# Patient Record
Sex: MALE | Race: WHITE | NOT HISPANIC OR LATINO | Employment: OTHER | URBAN - NONMETROPOLITAN AREA
[De-identification: names, ages, dates, MRNs, and addresses within clinical notes are randomized per-mention and may not be internally consistent; named-entity substitution may affect disease eponyms.]

---

## 2023-01-31 PROBLEM — G89.29 CHRONIC LEFT HIP PAIN: Status: ACTIVE | Noted: 2023-01-31

## 2023-01-31 PROBLEM — M62.81 MUSCLE WEAKNESS: Status: ACTIVE | Noted: 2023-01-31

## 2023-01-31 PROBLEM — M70.51 INFRAPATELLAR BURSITIS OF RIGHT KNEE: Status: ACTIVE | Noted: 2023-01-31

## 2023-01-31 PROBLEM — R00.0 TACHYCARDIA: Status: ACTIVE | Noted: 2023-01-31

## 2023-01-31 PROBLEM — M89.8X1 CHRONIC SCAPULAR PAIN: Status: ACTIVE | Noted: 2023-01-31

## 2023-01-31 PROBLEM — R93.1 ELEVATED CORONARY ARTERY CALCIUM SCORE: Status: ACTIVE | Noted: 2023-01-31

## 2023-01-31 PROBLEM — F41.9 ANXIETY: Status: ACTIVE | Noted: 2023-01-31

## 2023-01-31 PROBLEM — J18.9 PNEUMONIA: Status: ACTIVE | Noted: 2023-01-31

## 2023-01-31 PROBLEM — R93.1 ABNORMAL HEART SCORE CT: Status: ACTIVE | Noted: 2023-01-31

## 2023-01-31 PROBLEM — K76.9 LIVER LESION: Status: ACTIVE | Noted: 2023-01-31

## 2023-01-31 PROBLEM — M79.18 MYOFASCIAL PAIN: Status: ACTIVE | Noted: 2023-01-31

## 2023-01-31 PROBLEM — R29.898 LOWER EXTREMITY WEAKNESS: Status: ACTIVE | Noted: 2023-01-31

## 2023-01-31 PROBLEM — M54.50 BILATERAL LOW BACK PAIN: Status: ACTIVE | Noted: 2023-01-31

## 2023-01-31 PROBLEM — E87.6 HYPOKALEMIA: Status: ACTIVE | Noted: 2023-01-31

## 2023-01-31 PROBLEM — S83.8X2A INJURY OF MENISCUS OF LEFT KNEE: Status: ACTIVE | Noted: 2023-01-31

## 2023-01-31 PROBLEM — K74.60 CIRRHOSIS (MULTI): Status: ACTIVE | Noted: 2023-01-31

## 2023-01-31 PROBLEM — R07.9 CHEST PAIN: Status: ACTIVE | Noted: 2023-01-31

## 2023-01-31 PROBLEM — M25.552 CHRONIC LEFT HIP PAIN: Status: ACTIVE | Noted: 2023-01-31

## 2023-01-31 PROBLEM — M54.14 RADICULAR PAIN OF THORACIC REGION: Status: ACTIVE | Noted: 2023-01-31

## 2023-01-31 PROBLEM — M25.572 CHRONIC PAIN OF LEFT ANKLE: Status: ACTIVE | Noted: 2023-01-31

## 2023-01-31 PROBLEM — G56.03 BILATERAL CARPAL TUNNEL SYNDROME: Status: ACTIVE | Noted: 2023-01-31

## 2023-01-31 PROBLEM — I27.21 PULMONARY ARTERY HYPERTENSION (MULTI): Status: ACTIVE | Noted: 2023-01-31

## 2023-01-31 PROBLEM — G89.29 CHRONIC PAIN OF LEFT ANKLE: Status: ACTIVE | Noted: 2023-01-31

## 2023-01-31 PROBLEM — S80.02XA CONTUSION OF LEFT KNEE: Status: ACTIVE | Noted: 2023-01-31

## 2023-01-31 PROBLEM — K62.5 RECTAL BLEEDING: Status: ACTIVE | Noted: 2023-01-31

## 2023-01-31 PROBLEM — M25.462 EFFUSION OF LEFT KNEE: Status: ACTIVE | Noted: 2023-01-31

## 2023-01-31 PROBLEM — R14.0 ABDOMINAL DISTENSION: Status: ACTIVE | Noted: 2023-01-31

## 2023-01-31 PROBLEM — M54.9 UPPER BACK PAIN: Status: ACTIVE | Noted: 2023-01-31

## 2023-01-31 PROBLEM — I10 HYPERTENSION, BENIGN: Status: ACTIVE | Noted: 2023-01-31

## 2023-01-31 PROBLEM — M25.562 LEFT KNEE PAIN: Status: ACTIVE | Noted: 2023-01-31

## 2023-01-31 PROBLEM — I85.00 ESOPHAGEAL VARICES (MULTI): Status: ACTIVE | Noted: 2023-01-31

## 2023-01-31 PROBLEM — J45.21 MILD INTERMITTENT ASTHMA WITH ACUTE EXACERBATION (HHS-HCC): Status: ACTIVE | Noted: 2023-01-31

## 2023-01-31 PROBLEM — S82.002D CLOSED NONDISPLACED FRACTURE OF LEFT PATELLA WITH ROUTINE HEALING: Status: ACTIVE | Noted: 2023-01-31

## 2023-01-31 PROBLEM — K21.9 GERD (GASTROESOPHAGEAL REFLUX DISEASE): Status: ACTIVE | Noted: 2023-01-31

## 2023-01-31 PROBLEM — E55.9 VITAMIN D DEFICIENCY: Status: ACTIVE | Noted: 2023-01-31

## 2023-01-31 PROBLEM — R53.83 FATIGUE: Status: ACTIVE | Noted: 2023-01-31

## 2023-01-31 PROBLEM — K80.20 CHOLELITHIASIS: Status: ACTIVE | Noted: 2023-01-31

## 2023-01-31 PROBLEM — K29.00 ACUTE GASTRITIS: Status: ACTIVE | Noted: 2023-01-31

## 2023-01-31 PROBLEM — S89.92XS LEFT KNEE INJURY, SEQUELA: Status: ACTIVE | Noted: 2023-01-31

## 2023-01-31 PROBLEM — L29.9 PRURITUS: Status: ACTIVE | Noted: 2023-01-31

## 2023-01-31 PROBLEM — K57.30 DIVERTICULOSIS OF COLON WITHOUT DIVERTICULITIS: Status: ACTIVE | Noted: 2023-01-31

## 2023-01-31 PROBLEM — R73.9 HYPERGLYCEMIA: Status: ACTIVE | Noted: 2023-01-31

## 2023-01-31 PROBLEM — R19.5 DARK STOOLS: Status: ACTIVE | Noted: 2023-01-31

## 2023-01-31 PROBLEM — M70.52 INFRAPATELLAR BURSITIS OF LEFT KNEE: Status: ACTIVE | Noted: 2023-01-31

## 2023-01-31 PROBLEM — G89.29 CHRONIC SCAPULAR PAIN: Status: ACTIVE | Noted: 2023-01-31

## 2023-01-31 PROBLEM — R79.89 LOW TESTOSTERONE IN MALE: Status: ACTIVE | Noted: 2023-01-31

## 2023-01-31 PROBLEM — E83.42 HYPOMAGNESEMIA: Status: ACTIVE | Noted: 2023-01-31

## 2023-01-31 PROBLEM — E78.5 DYSLIPIDEMIA: Status: ACTIVE | Noted: 2023-01-31

## 2023-01-31 RX ORDER — CLOPIDOGREL BISULFATE 75 MG/1
1 TABLET ORAL DAILY
COMMUNITY
Start: 2022-04-19 | End: 2023-06-14 | Stop reason: SDUPTHER

## 2023-01-31 RX ORDER — FAMOTIDINE 40 MG/1
1 TABLET, FILM COATED ORAL DAILY
COMMUNITY
Start: 2022-06-20 | End: 2023-12-13 | Stop reason: ALTCHOICE

## 2023-01-31 RX ORDER — NAPROXEN SODIUM 220 MG/1
81 TABLET, FILM COATED ORAL DAILY
COMMUNITY
Start: 2022-06-20 | End: 2023-06-14 | Stop reason: ALTCHOICE

## 2023-01-31 RX ORDER — ATORVASTATIN CALCIUM 40 MG/1
1 TABLET, FILM COATED ORAL DAILY
COMMUNITY
End: 2023-06-14 | Stop reason: SDUPTHER

## 2023-01-31 RX ORDER — LANOLIN ALCOHOL/MO/W.PET/CERES
1 CREAM (GRAM) TOPICAL DAILY
COMMUNITY
Start: 2021-09-27 | End: 2024-03-13 | Stop reason: SDUPTHER

## 2023-01-31 RX ORDER — NADOLOL 20 MG/1
1 TABLET ORAL DAILY
COMMUNITY
Start: 2018-02-07

## 2023-01-31 RX ORDER — FUROSEMIDE 40 MG/1
1 TABLET ORAL DAILY
COMMUNITY
Start: 2018-02-07 | End: 2023-11-10 | Stop reason: SDUPTHER

## 2023-01-31 RX ORDER — PANTOPRAZOLE SODIUM 40 MG/1
1 TABLET, DELAYED RELEASE ORAL DAILY
COMMUNITY
End: 2023-03-14 | Stop reason: SDUPTHER

## 2023-01-31 RX ORDER — LANOLIN ALCOHOL/MO/W.PET/CERES
1 CREAM (GRAM) TOPICAL 2 TIMES DAILY
COMMUNITY
Start: 2022-01-04 | End: 2023-06-06 | Stop reason: SDUPTHER

## 2023-01-31 RX ORDER — NORTRIPTYLINE HYDROCHLORIDE 10 MG/1
CAPSULE ORAL NIGHTLY
COMMUNITY
Start: 2021-09-14

## 2023-01-31 RX ORDER — AMLODIPINE BESYLATE 5 MG/1
1 TABLET ORAL DAILY
COMMUNITY
Start: 2019-02-14

## 2023-01-31 RX ORDER — POTASSIUM CHLORIDE 1500 MG/1
1 TABLET, EXTENDED RELEASE ORAL DAILY
COMMUNITY
Start: 2019-11-14 | End: 2023-11-27 | Stop reason: SDUPTHER

## 2023-03-14 ENCOUNTER — OFFICE VISIT (OUTPATIENT)
Dept: PRIMARY CARE | Facility: CLINIC | Age: 53
End: 2023-03-14
Payer: MEDICARE

## 2023-03-14 VITALS
OXYGEN SATURATION: 97 % | HEIGHT: 66 IN | HEART RATE: 87 BPM | WEIGHT: 207.2 LBS | SYSTOLIC BLOOD PRESSURE: 102 MMHG | TEMPERATURE: 98.2 F | BODY MASS INDEX: 33.3 KG/M2 | DIASTOLIC BLOOD PRESSURE: 60 MMHG

## 2023-03-14 DIAGNOSIS — K74.60 CIRRHOSIS OF LIVER WITHOUT ASCITES, UNSPECIFIED HEPATIC CIRRHOSIS TYPE (MULTI): ICD-10-CM

## 2023-03-14 DIAGNOSIS — Z00.00 MEDICARE ANNUAL WELLNESS VISIT, SUBSEQUENT: Primary | ICD-10-CM

## 2023-03-14 DIAGNOSIS — K21.9 GASTROESOPHAGEAL REFLUX DISEASE WITHOUT ESOPHAGITIS: ICD-10-CM

## 2023-03-14 PROCEDURE — 3008F BODY MASS INDEX DOCD: CPT | Performed by: FAMILY MEDICINE

## 2023-03-14 PROCEDURE — 3078F DIAST BP <80 MM HG: CPT | Performed by: FAMILY MEDICINE

## 2023-03-14 PROCEDURE — G0439 PPPS, SUBSEQ VISIT: HCPCS | Performed by: FAMILY MEDICINE

## 2023-03-14 PROCEDURE — 3074F SYST BP LT 130 MM HG: CPT | Performed by: FAMILY MEDICINE

## 2023-03-14 RX ORDER — PANTOPRAZOLE SODIUM 40 MG/1
40 TABLET, DELAYED RELEASE ORAL
Qty: 90 TABLET | Refills: 1 | Status: SHIPPED | OUTPATIENT
Start: 2023-03-14 | End: 2024-03-13 | Stop reason: SDUPTHER

## 2023-03-14 ASSESSMENT — PATIENT HEALTH QUESTIONNAIRE - PHQ9
2. FEELING DOWN, DEPRESSED OR HOPELESS: NOT AT ALL
SUM OF ALL RESPONSES TO PHQ9 QUESTIONS 1 AND 2: 0
1. LITTLE INTEREST OR PLEASURE IN DOING THINGS: NOT AT ALL

## 2023-03-14 ASSESSMENT — ACTIVITIES OF DAILY LIVING (ADL)
DOING_HOUSEWORK: INDEPENDENT
GROCERY_SHOPPING: INDEPENDENT
BATHING: INDEPENDENT
TAKING_MEDICATION: INDEPENDENT
DRESSING: INDEPENDENT
MANAGING_FINANCES: INDEPENDENT

## 2023-03-14 ASSESSMENT — ENCOUNTER SYMPTOMS
OCCASIONAL FEELINGS OF UNSTEADINESS: 1
LOSS OF SENSATION IN FEET: 0
DEPRESSION: 0

## 2023-03-14 NOTE — PATIENT INSTRUCTIONS
Please follow up in 3 months  Please take your medications as prescribed  please follow up with GI and nutritionist

## 2023-03-14 NOTE — PROGRESS NOTES
"Subjective   Reason for Visit: Tin Mendoza is an 52 y.o. male here for a Medicare Wellness visit.   Acid reflux  Weight gain         Reviewed all medications by prescribing practitioner or clinical pharmacist (such as prescriptions, OTCs, herbal therapies and supplements) and documented in the medical record.    HPI    Here for medicare annual visit  Has been having GERD, on pepcid and protonix, not controlled. Does have cirrhosis, overdue for follow up with GI  Having difficulty losing weight,been eating healthier and exercising.     Patient Self Assessment of Health Status  Patient Self Assessment: Poor    Nutrition and Exercise  Current Diet: Well Balanced Diet  Adequate Fluid Intake: Yes  Caffeine: Yes  Exercise Frequency: Infrequently    Functional Ability/Level of Safety  Cognitive Impairment Observed: No cognitive impairment observed  Cognitive Impairment Reported: No cognitive impairment reported by patient or family    Home Safety Risk Factors: None    Patient Care Team:  Noe Faust MD as PCP - General  Noe Faust MD as PCP - [facility] Attributed Provider     Review of Systems  General: no fever  Eyes: no blurry vision  ENT: no sore throat, no ear pain  Resp: no cough, sob or wheezing  Cardio: no chest pain, no palpitations  Abd: no nausea/vomiting  : no dysuria, no increased urinary frequency      Objective   Vitals:  /60   Pulse 87   Temp 36.8 °C (98.2 °F)   Ht 1.676 m (5' 6\")   Wt 94 kg (207 lb 3.2 oz)   SpO2 97%   BMI 33.44 kg/m²       Physical Exam  Gen: NAD, alert  Head: normocephalic/atraumatic  Eyes: conjunctivae normal  Ears: canals clear bilaterally, TM normal   Nose: Deferred due to covid precautions, wearing mask  Oropharynx: Deferred due to covid precautions, wearing mask  Resp: Clear to auscultation  CVS: Regular rate and rhythm  Abdomen: soft, NT, ND  Ext: no edema, NT of lower extremities  Neuro: gait normal       Assessment/Plan   Problem List " Items Addressed This Visit       Cirrhosis (CMS/HCC)    Relevant Orders    Referral to Gastroenterology    GERD (gastroesophageal reflux disease)    Relevant Medications    pantoprazole (ProtoNix) 40 mg EC tablet    Other Relevant Orders    Referral to Gastroenterology     Other Visit Diagnoses       Medicare annual wellness visit, subsequent    -  Primary    BMI 33.0-33.9,adult        Relevant Orders    Referral to Nutrition Services

## 2023-06-06 DIAGNOSIS — E83.42 HYPOMAGNESEMIA: ICD-10-CM

## 2023-06-06 RX ORDER — LANOLIN ALCOHOL/MO/W.PET/CERES
1 CREAM (GRAM) TOPICAL 2 TIMES DAILY
Qty: 180 TABLET | Refills: 1 | Status: SHIPPED | OUTPATIENT
Start: 2023-06-06 | End: 2023-12-03

## 2023-06-14 ENCOUNTER — LAB (OUTPATIENT)
Dept: LAB | Facility: LAB | Age: 53
End: 2023-06-14
Payer: MEDICARE

## 2023-06-14 ENCOUNTER — OFFICE VISIT (OUTPATIENT)
Dept: PRIMARY CARE | Facility: CLINIC | Age: 53
End: 2023-06-14
Payer: MEDICARE

## 2023-06-14 VITALS
OXYGEN SATURATION: 98 % | SYSTOLIC BLOOD PRESSURE: 108 MMHG | BODY MASS INDEX: 34.35 KG/M2 | HEART RATE: 87 BPM | DIASTOLIC BLOOD PRESSURE: 74 MMHG | TEMPERATURE: 97.7 F | WEIGHT: 201.2 LBS | HEIGHT: 64 IN

## 2023-06-14 DIAGNOSIS — I25.83 CORONARY ARTERY DISEASE DUE TO LIPID RICH PLAQUE: ICD-10-CM

## 2023-06-14 DIAGNOSIS — I27.21 PULMONARY ARTERY HYPERTENSION (MULTI): ICD-10-CM

## 2023-06-14 DIAGNOSIS — I85.00 ESOPHAGEAL VARICES WITHOUT BLEEDING, UNSPECIFIED ESOPHAGEAL VARICES TYPE (MULTI): ICD-10-CM

## 2023-06-14 DIAGNOSIS — F17.210 CIGARETTE NICOTINE DEPENDENCE WITHOUT COMPLICATION: ICD-10-CM

## 2023-06-14 DIAGNOSIS — E78.5 DYSLIPIDEMIA: ICD-10-CM

## 2023-06-14 DIAGNOSIS — K74.60 CIRRHOSIS OF LIVER WITHOUT ASCITES, UNSPECIFIED HEPATIC CIRRHOSIS TYPE (MULTI): ICD-10-CM

## 2023-06-14 DIAGNOSIS — I25.10 CORONARY ARTERY DISEASE DUE TO LIPID RICH PLAQUE: ICD-10-CM

## 2023-06-14 DIAGNOSIS — K74.60 CIRRHOSIS OF LIVER WITHOUT ASCITES, UNSPECIFIED HEPATIC CIRRHOSIS TYPE (MULTI): Primary | ICD-10-CM

## 2023-06-14 PROBLEM — S80.02XA CONTUSION OF LEFT KNEE: Status: RESOLVED | Noted: 2023-01-31 | Resolved: 2023-06-14

## 2023-06-14 PROBLEM — M70.51 INFRAPATELLAR BURSITIS OF RIGHT KNEE: Status: RESOLVED | Noted: 2023-01-31 | Resolved: 2023-06-14

## 2023-06-14 PROBLEM — M25.562 LEFT KNEE PAIN: Status: RESOLVED | Noted: 2023-01-31 | Resolved: 2023-06-14

## 2023-06-14 PROBLEM — M25.552 CHRONIC LEFT HIP PAIN: Status: RESOLVED | Noted: 2023-01-31 | Resolved: 2023-06-14

## 2023-06-14 PROBLEM — K80.20 CHOLELITHIASIS: Status: RESOLVED | Noted: 2023-01-31 | Resolved: 2023-06-14

## 2023-06-14 PROBLEM — J18.9 PNEUMONIA: Status: RESOLVED | Noted: 2023-01-31 | Resolved: 2023-06-14

## 2023-06-14 PROBLEM — R29.898 LOWER EXTREMITY WEAKNESS: Status: RESOLVED | Noted: 2023-01-31 | Resolved: 2023-06-14

## 2023-06-14 PROBLEM — S83.8X2A INJURY OF MENISCUS OF LEFT KNEE: Status: RESOLVED | Noted: 2023-01-31 | Resolved: 2023-06-14

## 2023-06-14 PROBLEM — K62.5 RECTAL BLEEDING: Status: RESOLVED | Noted: 2023-01-31 | Resolved: 2023-06-14

## 2023-06-14 PROBLEM — G89.29 CHRONIC LEFT HIP PAIN: Status: RESOLVED | Noted: 2023-01-31 | Resolved: 2023-06-14

## 2023-06-14 PROBLEM — M25.572 CHRONIC PAIN OF LEFT ANKLE: Status: RESOLVED | Noted: 2023-01-31 | Resolved: 2023-06-14

## 2023-06-14 PROBLEM — M54.14 RADICULAR PAIN OF THORACIC REGION: Status: RESOLVED | Noted: 2023-01-31 | Resolved: 2023-06-14

## 2023-06-14 PROBLEM — G89.29 CHRONIC SCAPULAR PAIN: Status: RESOLVED | Noted: 2023-01-31 | Resolved: 2023-06-14

## 2023-06-14 PROBLEM — S82.002D CLOSED NONDISPLACED FRACTURE OF LEFT PATELLA WITH ROUTINE HEALING: Status: RESOLVED | Noted: 2023-01-31 | Resolved: 2023-06-14

## 2023-06-14 PROBLEM — R53.83 FATIGUE: Status: RESOLVED | Noted: 2023-01-31 | Resolved: 2023-06-14

## 2023-06-14 PROBLEM — G56.03 BILATERAL CARPAL TUNNEL SYNDROME: Status: RESOLVED | Noted: 2023-01-31 | Resolved: 2023-06-14

## 2023-06-14 PROBLEM — M25.462 EFFUSION OF LEFT KNEE: Status: RESOLVED | Noted: 2023-01-31 | Resolved: 2023-06-14

## 2023-06-14 PROBLEM — R19.5 DARK STOOLS: Status: RESOLVED | Noted: 2023-01-31 | Resolved: 2023-06-14

## 2023-06-14 PROBLEM — K29.00 ACUTE GASTRITIS: Status: RESOLVED | Noted: 2023-01-31 | Resolved: 2023-06-14

## 2023-06-14 PROBLEM — R00.0 TACHYCARDIA: Status: RESOLVED | Noted: 2023-01-31 | Resolved: 2023-06-14

## 2023-06-14 PROBLEM — M54.50 BILATERAL LOW BACK PAIN: Status: RESOLVED | Noted: 2023-01-31 | Resolved: 2023-06-14

## 2023-06-14 PROBLEM — G89.29 CHRONIC PAIN OF LEFT ANKLE: Status: RESOLVED | Noted: 2023-01-31 | Resolved: 2023-06-14

## 2023-06-14 PROBLEM — M89.8X1 CHRONIC SCAPULAR PAIN: Status: RESOLVED | Noted: 2023-01-31 | Resolved: 2023-06-14

## 2023-06-14 PROBLEM — K57.30 DIVERTICULOSIS OF COLON WITHOUT DIVERTICULITIS: Status: RESOLVED | Noted: 2023-01-31 | Resolved: 2023-06-14

## 2023-06-14 PROBLEM — R73.9 HYPERGLYCEMIA: Status: RESOLVED | Noted: 2023-01-31 | Resolved: 2023-06-14

## 2023-06-14 PROBLEM — R93.1 ABNORMAL HEART SCORE CT: Status: RESOLVED | Noted: 2023-01-31 | Resolved: 2023-06-14

## 2023-06-14 PROBLEM — M79.18 MYOFASCIAL PAIN: Status: RESOLVED | Noted: 2023-01-31 | Resolved: 2023-06-14

## 2023-06-14 PROBLEM — L29.9 PRURITUS: Status: RESOLVED | Noted: 2023-01-31 | Resolved: 2023-06-14

## 2023-06-14 PROBLEM — R14.0 ABDOMINAL DISTENSION: Status: RESOLVED | Noted: 2023-01-31 | Resolved: 2023-06-14

## 2023-06-14 PROBLEM — E87.6 HYPOKALEMIA: Status: RESOLVED | Noted: 2023-01-31 | Resolved: 2023-06-14

## 2023-06-14 PROBLEM — M54.9 UPPER BACK PAIN: Status: RESOLVED | Noted: 2023-01-31 | Resolved: 2023-06-14

## 2023-06-14 PROBLEM — M62.81 MUSCLE WEAKNESS: Status: RESOLVED | Noted: 2023-01-31 | Resolved: 2023-06-14

## 2023-06-14 PROBLEM — R07.9 CHEST PAIN: Status: RESOLVED | Noted: 2023-01-31 | Resolved: 2023-06-14

## 2023-06-14 PROBLEM — R93.1 ELEVATED CORONARY ARTERY CALCIUM SCORE: Status: RESOLVED | Noted: 2023-01-31 | Resolved: 2023-06-14

## 2023-06-14 PROBLEM — E55.9 VITAMIN D DEFICIENCY: Status: RESOLVED | Noted: 2023-01-31 | Resolved: 2023-06-14

## 2023-06-14 PROBLEM — K76.9 LIVER LESION: Status: RESOLVED | Noted: 2023-01-31 | Resolved: 2023-06-14

## 2023-06-14 PROBLEM — S89.92XS LEFT KNEE INJURY, SEQUELA: Status: RESOLVED | Noted: 2023-01-31 | Resolved: 2023-06-14

## 2023-06-14 PROBLEM — M70.52 INFRAPATELLAR BURSITIS OF LEFT KNEE: Status: RESOLVED | Noted: 2023-01-31 | Resolved: 2023-06-14

## 2023-06-14 LAB
ALANINE AMINOTRANSFERASE (SGPT) (U/L) IN SER/PLAS: 16 U/L (ref 10–52)
ALBUMIN (G/DL) IN SER/PLAS: 4.4 G/DL (ref 3.4–5)
ALKALINE PHOSPHATASE (U/L) IN SER/PLAS: 64 U/L (ref 33–120)
ALPHA-1 FETOPROTEIN (NG/ML) IN SER/PLAS: 4 NG/ML (ref 0–9)
ANION GAP IN SER/PLAS: 12 MMOL/L (ref 10–20)
ASPARTATE AMINOTRANSFERASE (SGOT) (U/L) IN SER/PLAS: 19 U/L (ref 9–39)
BILIRUBIN TOTAL (MG/DL) IN SER/PLAS: 0.3 MG/DL (ref 0–1.2)
CALCIUM (MG/DL) IN SER/PLAS: 9.5 MG/DL (ref 8.6–10.3)
CARBON DIOXIDE, TOTAL (MMOL/L) IN SER/PLAS: 26 MMOL/L (ref 21–32)
CHLORIDE (MMOL/L) IN SER/PLAS: 103 MMOL/L (ref 98–107)
CHOLESTEROL (MG/DL) IN SER/PLAS: 250 MG/DL (ref 0–199)
CHOLESTEROL IN HDL (MG/DL) IN SER/PLAS: 38.7 MG/DL
CHOLESTEROL/HDL RATIO: 6.5
CREATININE (MG/DL) IN SER/PLAS: 0.86 MG/DL (ref 0.5–1.3)
ERYTHROCYTE DISTRIBUTION WIDTH (RATIO) BY AUTOMATED COUNT: 12.4 % (ref 11.5–14.5)
ERYTHROCYTE MEAN CORPUSCULAR HEMOGLOBIN CONCENTRATION (G/DL) BY AUTOMATED: 32.6 G/DL (ref 32–36)
ERYTHROCYTE MEAN CORPUSCULAR VOLUME (FL) BY AUTOMATED COUNT: 97 FL (ref 80–100)
ERYTHROCYTES (10*6/UL) IN BLOOD BY AUTOMATED COUNT: 4.73 X10E12/L (ref 4.5–5.9)
GFR MALE: >90 ML/MIN/1.73M2
GLUCOSE (MG/DL) IN SER/PLAS: 104 MG/DL (ref 74–99)
HEMATOCRIT (%) IN BLOOD BY AUTOMATED COUNT: 46 % (ref 41–52)
HEMOGLOBIN (G/DL) IN BLOOD: 15 G/DL (ref 13.5–17.5)
LDL: 160 MG/DL (ref 0–99)
LEUKOCYTES (10*3/UL) IN BLOOD BY AUTOMATED COUNT: 9.8 X10E9/L (ref 4.4–11.3)
MAGNESIUM (MG/DL) IN SER/PLAS: 1.91 MG/DL (ref 1.6–2.4)
NON HDL CHOLESTEROL: 211 MG/DL
PLATELETS (10*3/UL) IN BLOOD AUTOMATED COUNT: 212 X10E9/L (ref 150–450)
POTASSIUM (MMOL/L) IN SER/PLAS: 4.4 MMOL/L (ref 3.5–5.3)
PROTEIN TOTAL: 7.5 G/DL (ref 6.4–8.2)
SODIUM (MMOL/L) IN SER/PLAS: 137 MMOL/L (ref 136–145)
THYROTROPIN (MIU/L) IN SER/PLAS BY DETECTION LIMIT <= 0.05 MIU/L: 2.19 MIU/L (ref 0.44–3.98)
TRIGLYCERIDE (MG/DL) IN SER/PLAS: 258 MG/DL (ref 0–149)
UREA NITROGEN (MG/DL) IN SER/PLAS: 13 MG/DL (ref 6–23)
VLDL: 52 MG/DL (ref 0–40)

## 2023-06-14 PROCEDURE — 3008F BODY MASS INDEX DOCD: CPT | Performed by: FAMILY MEDICINE

## 2023-06-14 PROCEDURE — 83735 ASSAY OF MAGNESIUM: CPT

## 2023-06-14 PROCEDURE — 99214 OFFICE O/P EST MOD 30 MIN: CPT | Performed by: FAMILY MEDICINE

## 2023-06-14 PROCEDURE — 80061 LIPID PANEL: CPT

## 2023-06-14 PROCEDURE — 3078F DIAST BP <80 MM HG: CPT | Performed by: FAMILY MEDICINE

## 2023-06-14 PROCEDURE — 3074F SYST BP LT 130 MM HG: CPT | Performed by: FAMILY MEDICINE

## 2023-06-14 PROCEDURE — 4004F PT TOBACCO SCREEN RCVD TLK: CPT | Performed by: FAMILY MEDICINE

## 2023-06-14 PROCEDURE — 84443 ASSAY THYROID STIM HORMONE: CPT

## 2023-06-14 PROCEDURE — 36415 COLL VENOUS BLD VENIPUNCTURE: CPT

## 2023-06-14 PROCEDURE — 82105 ALPHA-FETOPROTEIN SERUM: CPT

## 2023-06-14 PROCEDURE — 80053 COMPREHEN METABOLIC PANEL: CPT

## 2023-06-14 PROCEDURE — 85027 COMPLETE CBC AUTOMATED: CPT

## 2023-06-14 RX ORDER — CLOPIDOGREL BISULFATE 75 MG/1
75 TABLET ORAL DAILY
Qty: 90 TABLET | Refills: 1 | Status: SHIPPED | OUTPATIENT
Start: 2023-06-14 | End: 2024-04-17 | Stop reason: SDUPTHER

## 2023-06-14 RX ORDER — ATORVASTATIN CALCIUM 40 MG/1
40 TABLET, FILM COATED ORAL DAILY
Qty: 90 TABLET | Refills: 1 | Status: SHIPPED | OUTPATIENT
Start: 2023-06-14 | End: 2024-04-17 | Stop reason: SDUPTHER

## 2023-06-14 ASSESSMENT — PATIENT HEALTH QUESTIONNAIRE - PHQ9
2. FEELING DOWN, DEPRESSED OR HOPELESS: NOT AT ALL
1. LITTLE INTEREST OR PLEASURE IN DOING THINGS: NOT AT ALL
SUM OF ALL RESPONSES TO PHQ9 QUESTIONS 1 AND 2: 0

## 2023-06-14 ASSESSMENT — ENCOUNTER SYMPTOMS
OCCASIONAL FEELINGS OF UNSTEADINESS: 0
DEPRESSION: 0
LOSS OF SENSATION IN FEET: 0

## 2023-06-14 NOTE — PROGRESS NOTES
"Subjective   Patient ID: Tin Mendoza is a 52 y.o. male who presents for Follow-up (Fasting today/Weight concerns is all).  HPI  Saw cardiology for elevated cardiac score, stopped the ASA and was started on plavix and lipitor  Has been gaining weight is following with nutritionist  Has cirrhosis, been years since he saw GI, has history of esophageal varices and pulmonary artery hypertension, due for EGD as well  Still smoking, has a 30 pack year history of smoking    Review of Systems  General: no fever  Eyes: no blurry vision  ENT: no sore throat, no ear pain  Resp: no cough, sob or wheezing  Cardio: no chest pain, no palpitations  Abd: no nausea/vomiting  : no dysuria, no increased urinary frequency      /74   Pulse 87   Temp 36.5 °C (97.7 °F)   Ht 1.626 m (5' 4\")   Wt 91.3 kg (201 lb 3.2 oz)   SpO2 98%   BMI 34.54 kg/m²       Objective   Physical Exam  Gen: NAD, alert  Head: normocephalic/atraumatic  Eyes: conjunctivae normal  Ears: canals clear bilaterally, TM normal   Nose: external nose normal   Resp: Clear to auscultation  CVS: Regular rate and rhythm  Abdomen: soft, NT, ND  Ext: no edema, NT of lower extremities  Neuro: gait normal       Assessment/Plan   Problem List Items Addressed This Visit       Cirrhosis (CMS/HCC) - Primary    Relevant Orders    Comprehensive Metabolic Panel    CBC    Magnesium    US abdomen limited liver    Referral to Gastroenterology    Alpha-Fetoprotein    Dyslipidemia    Relevant Orders    Lipid Panel    TSH with reflex to Free T4 if abnormal    Esophageal varices (CMS/HCC)    Relevant Orders    Referral to Gastroenterology    Pulmonary artery hypertension (CMS/HCC)     Other Visit Diagnoses       Coronary artery disease due to lipid rich plaque        Relevant Medications    clopidogrel (Plavix) 75 mg tablet    atorvastatin (Lipitor) 40 mg tablet    Cigarette nicotine dependence without complication        Relevant Orders    CT lung screening low dose    BMI " 34.0-34.9,adult

## 2023-06-14 NOTE — PATIENT INSTRUCTIONS
Please follow up in 6 months  Please take your medications as prescribed  please follow up with GI  Please get your blood work, CT chest and ultrasound done

## 2023-11-07 ENCOUNTER — TELEPHONE (OUTPATIENT)
Dept: PRIMARY CARE | Facility: CLINIC | Age: 53
End: 2023-11-07
Payer: MEDICARE

## 2023-11-07 DIAGNOSIS — J40 BRONCHITIS: Primary | ICD-10-CM

## 2023-11-07 RX ORDER — AZITHROMYCIN 250 MG/1
TABLET, FILM COATED ORAL
Qty: 6 TABLET | Refills: 0 | Status: SHIPPED | OUTPATIENT
Start: 2023-11-07 | End: 2023-11-12

## 2023-11-07 NOTE — TELEPHONE ENCOUNTER
Patient was seen at [facility] for Bronchitis.  Got Z-Cain.   Finished it and is doing better but asking for another round.  Please send to [facility] in [place]

## 2023-11-10 DIAGNOSIS — I27.21 PULMONARY ARTERY HYPERTENSION (MULTI): ICD-10-CM

## 2023-11-10 RX ORDER — FUROSEMIDE 40 MG/1
40 TABLET ORAL DAILY
Qty: 90 TABLET | Refills: 1 | Status: SHIPPED | OUTPATIENT
Start: 2023-11-10 | End: 2024-02-21 | Stop reason: SDUPTHER

## 2023-11-27 DIAGNOSIS — E78.5 DYSLIPIDEMIA: ICD-10-CM

## 2023-11-28 RX ORDER — POTASSIUM CHLORIDE 20 MEQ/1
20 TABLET, EXTENDED RELEASE ORAL DAILY
Qty: 90 TABLET | Refills: 1 | Status: SHIPPED | OUTPATIENT
Start: 2023-11-28 | End: 2024-02-21 | Stop reason: SDUPTHER

## 2023-12-13 ENCOUNTER — OFFICE VISIT (OUTPATIENT)
Dept: PRIMARY CARE | Facility: CLINIC | Age: 53
End: 2023-12-13
Payer: MEDICARE

## 2023-12-13 ENCOUNTER — LAB (OUTPATIENT)
Dept: LAB | Facility: LAB | Age: 53
End: 2023-12-13
Payer: MEDICARE

## 2023-12-13 VITALS
DIASTOLIC BLOOD PRESSURE: 72 MMHG | TEMPERATURE: 97 F | SYSTOLIC BLOOD PRESSURE: 119 MMHG | BODY MASS INDEX: 32.61 KG/M2 | OXYGEN SATURATION: 97 % | WEIGHT: 191 LBS | HEART RATE: 67 BPM | HEIGHT: 64 IN

## 2023-12-13 DIAGNOSIS — R73.9 BORDERLINE HYPERGLYCEMIA: ICD-10-CM

## 2023-12-13 DIAGNOSIS — R53.1 LEFT-SIDED WEAKNESS: Primary | ICD-10-CM

## 2023-12-13 DIAGNOSIS — E55.9 VITAMIN D DEFICIENCY: ICD-10-CM

## 2023-12-13 DIAGNOSIS — M62.838 MUSCLE SPASM: ICD-10-CM

## 2023-12-13 DIAGNOSIS — R42 LIGHTHEADEDNESS: ICD-10-CM

## 2023-12-13 LAB
25(OH)D3 SERPL-MCNC: 16 NG/ML (ref 30–100)
ANION GAP SERPL CALC-SCNC: 13 MMOL/L (ref 10–20)
BUN SERPL-MCNC: 15 MG/DL (ref 6–23)
CALCIUM SERPL-MCNC: 9.5 MG/DL (ref 8.6–10.3)
CHLORIDE SERPL-SCNC: 104 MMOL/L (ref 98–107)
CO2 SERPL-SCNC: 27 MMOL/L (ref 21–32)
CREAT SERPL-MCNC: 0.85 MG/DL (ref 0.5–1.3)
ERYTHROCYTE [DISTWIDTH] IN BLOOD BY AUTOMATED COUNT: 12.5 % (ref 11.5–14.5)
EST. AVERAGE GLUCOSE BLD GHB EST-MCNC: 114 MG/DL
GFR SERPL CREATININE-BSD FRML MDRD: >90 ML/MIN/1.73M*2
GLUCOSE SERPL-MCNC: 96 MG/DL (ref 74–99)
HBA1C MFR BLD: 5.6 %
HCT VFR BLD AUTO: 45 % (ref 41–52)
HGB BLD-MCNC: 14.7 G/DL (ref 13.5–17.5)
MAGNESIUM SERPL-MCNC: 1.98 MG/DL (ref 1.6–2.4)
MCH RBC QN AUTO: 32.5 PG (ref 26–34)
MCHC RBC AUTO-ENTMCNC: 32.7 G/DL (ref 32–36)
MCV RBC AUTO: 99 FL (ref 80–100)
NRBC BLD-RTO: 0 /100 WBCS (ref 0–0)
PLATELET # BLD AUTO: 252 X10*3/UL (ref 150–450)
POTASSIUM SERPL-SCNC: 4.7 MMOL/L (ref 3.5–5.3)
RBC # BLD AUTO: 4.53 X10*6/UL (ref 4.5–5.9)
SODIUM SERPL-SCNC: 139 MMOL/L (ref 136–145)
VIT B12 SERPL-MCNC: 389 PG/ML (ref 211–911)
WBC # BLD AUTO: 11.8 X10*3/UL (ref 4.4–11.3)

## 2023-12-13 PROCEDURE — 99214 OFFICE O/P EST MOD 30 MIN: CPT | Performed by: FAMILY MEDICINE

## 2023-12-13 PROCEDURE — 82607 VITAMIN B-12: CPT

## 2023-12-13 PROCEDURE — 3074F SYST BP LT 130 MM HG: CPT | Performed by: FAMILY MEDICINE

## 2023-12-13 PROCEDURE — 83036 HEMOGLOBIN GLYCOSYLATED A1C: CPT

## 2023-12-13 PROCEDURE — 36415 COLL VENOUS BLD VENIPUNCTURE: CPT

## 2023-12-13 PROCEDURE — 3008F BODY MASS INDEX DOCD: CPT | Performed by: FAMILY MEDICINE

## 2023-12-13 PROCEDURE — 4004F PT TOBACCO SCREEN RCVD TLK: CPT | Performed by: FAMILY MEDICINE

## 2023-12-13 PROCEDURE — 3078F DIAST BP <80 MM HG: CPT | Performed by: FAMILY MEDICINE

## 2023-12-13 PROCEDURE — 82306 VITAMIN D 25 HYDROXY: CPT

## 2023-12-13 RX ORDER — TIZANIDINE 2 MG/1
2 TABLET ORAL EVERY 12 HOURS PRN
Qty: 30 TABLET | Refills: 1 | Status: SHIPPED | OUTPATIENT
Start: 2023-12-13 | End: 2024-02-21 | Stop reason: SDUPTHER

## 2023-12-13 ASSESSMENT — ENCOUNTER SYMPTOMS
OCCASIONAL FEELINGS OF UNSTEADINESS: 0
LOSS OF SENSATION IN FEET: 0
DEPRESSION: 0

## 2023-12-13 ASSESSMENT — PATIENT HEALTH QUESTIONNAIRE - PHQ9
SUM OF ALL RESPONSES TO PHQ9 QUESTIONS 1 AND 2: 0
2. FEELING DOWN, DEPRESSED OR HOPELESS: NOT AT ALL
1. LITTLE INTEREST OR PLEASURE IN DOING THINGS: NOT AT ALL

## 2023-12-13 NOTE — PROGRESS NOTES
"Subjective   Patient ID: Tin Mendoza is a 53 y.o. male who presents for Follow-up (6 months/Has been having feelings like bubbles in chest ).    HPI    Here for follow up   For the past year off and on will get episodes of lightheadedness and weakness on the left side, to the point where he almost passes out. Denies skipping meals, does keep hydrated. Can happen sporadically sometimes even at the stores he has almost fallen because of it.   Also for the past 8 months feels bubbly sensation across his chest radiating up his neck on the left side. Does get gas at times. Compliant with protonix. Worried about his heart, been over a year since he saw his cardiologist, will follow up . Did have CT chest done 6 months ago which showed lung nodule but was benign.     Review of Systems  General: no fever  Eyes: no blurry vision  ENT: no sore throat, no ear pain  Resp: no cough, sob or wheezing  Cardio: see HPI  Abd: no nausea/vomiting  : no dysuria, no increased urinary frequency      /72   Pulse 67   Temp 36.1 °C (97 °F)   Ht 1.626 m (5' 4\")   Wt 86.6 kg (191 lb)   SpO2 97%   BMI 32.79 kg/m²       Objective   Physical Exam  Gen: NAD, alert  Head: normocephalic/atraumatic  Eyes: conjunctivae normal  Ears: canals clear bilaterally, TM normal   Nose: external nose normal   Oropharynx: clear   Resp: Clear to auscultation  CVS: Regular rate and rhythm  Abdomen: soft, NT, ND  Ext: no edema, NT of lower extremities  Neuro: using cane to ambulate    Assessment/Plan   Problem List Items Addressed This Visit    None  Visit Diagnoses       Left-sided weakness    -  Primary    Relevant Orders    CT head wo IV contrast    Lightheadedness        Relevant Orders    CT head wo IV contrast    Magnesium (Completed)    Basic metabolic panel (Completed)    CBC (Completed)    Vitamin B12    Borderline hyperglycemia        Relevant Orders    Hemoglobin A1c    Vitamin D deficiency        Relevant Orders    Vitamin D " 25-Hydroxy,Total (for eval of Vitamin D levels)    Muscle spasm        Relevant Medications    tiZANidine (Zanaflex) 2 mg tablet

## 2023-12-14 DIAGNOSIS — E55.9 VITAMIN D DEFICIENCY: Primary | ICD-10-CM

## 2023-12-14 RX ORDER — ERGOCALCIFEROL 1.25 MG/1
50000 CAPSULE ORAL
Qty: 12 CAPSULE | Refills: 1 | Status: SHIPPED | OUTPATIENT
Start: 2023-12-14 | End: 2024-02-21 | Stop reason: SDUPTHER

## 2023-12-15 ENCOUNTER — HOSPITAL ENCOUNTER (OUTPATIENT)
Dept: RADIOLOGY | Facility: HOSPITAL | Age: 53
Discharge: HOME | End: 2023-12-15
Payer: MEDICARE

## 2023-12-15 DIAGNOSIS — R53.1 LEFT-SIDED WEAKNESS: ICD-10-CM

## 2023-12-15 DIAGNOSIS — R42 LIGHTHEADEDNESS: ICD-10-CM

## 2023-12-15 PROCEDURE — 70450 CT HEAD/BRAIN W/O DYE: CPT

## 2023-12-15 PROCEDURE — 70450 CT HEAD/BRAIN W/O DYE: CPT | Performed by: RADIOLOGY

## 2024-02-21 DIAGNOSIS — E78.5 DYSLIPIDEMIA: ICD-10-CM

## 2024-02-21 DIAGNOSIS — M62.838 MUSCLE SPASM: ICD-10-CM

## 2024-02-21 DIAGNOSIS — I27.21 PULMONARY ARTERY HYPERTENSION (MULTI): ICD-10-CM

## 2024-02-21 DIAGNOSIS — E55.9 VITAMIN D DEFICIENCY: ICD-10-CM

## 2024-02-21 RX ORDER — FUROSEMIDE 40 MG/1
40 TABLET ORAL DAILY
Qty: 90 TABLET | Refills: 1 | Status: SHIPPED | OUTPATIENT
Start: 2024-02-21

## 2024-02-21 RX ORDER — TIZANIDINE 2 MG/1
2 TABLET ORAL EVERY 12 HOURS PRN
Qty: 30 TABLET | Refills: 1 | Status: SHIPPED | OUTPATIENT
Start: 2024-02-21

## 2024-02-21 RX ORDER — POTASSIUM CHLORIDE 20 MEQ/1
20 TABLET, EXTENDED RELEASE ORAL DAILY
Qty: 90 TABLET | Refills: 1 | Status: SHIPPED | OUTPATIENT
Start: 2024-02-21

## 2024-02-21 RX ORDER — ERGOCALCIFEROL 1.25 MG/1
50000 CAPSULE ORAL
Qty: 12 CAPSULE | Refills: 1 | Status: SHIPPED | OUTPATIENT
Start: 2024-02-21 | End: 2024-08-07

## 2024-03-13 ENCOUNTER — OFFICE VISIT (OUTPATIENT)
Dept: PRIMARY CARE | Facility: CLINIC | Age: 54
End: 2024-03-13
Payer: MEDICARE

## 2024-03-13 VITALS
HEART RATE: 74 BPM | DIASTOLIC BLOOD PRESSURE: 81 MMHG | BODY MASS INDEX: 30.53 KG/M2 | HEIGHT: 66 IN | WEIGHT: 190 LBS | OXYGEN SATURATION: 97 % | TEMPERATURE: 97 F | SYSTOLIC BLOOD PRESSURE: 120 MMHG

## 2024-03-13 DIAGNOSIS — Z00.00 MEDICARE ANNUAL WELLNESS VISIT, SUBSEQUENT: Primary | ICD-10-CM

## 2024-03-13 DIAGNOSIS — F17.200 NICOTINE DEPENDENCE, UNCOMPLICATED, UNSPECIFIED NICOTINE PRODUCT TYPE: ICD-10-CM

## 2024-03-13 DIAGNOSIS — J44.1 CHRONIC OBSTRUCTIVE PULMONARY DISEASE WITH ACUTE EXACERBATION (MULTI): ICD-10-CM

## 2024-03-13 DIAGNOSIS — E51.9 VITAMIN B1 DEFICIENCY: ICD-10-CM

## 2024-03-13 DIAGNOSIS — R07.9 CHEST PAIN, UNSPECIFIED TYPE: ICD-10-CM

## 2024-03-13 DIAGNOSIS — I27.21 PULMONARY ARTERY HYPERTENSION (MULTI): ICD-10-CM

## 2024-03-13 DIAGNOSIS — I85.00 ESOPHAGEAL VARICES WITHOUT BLEEDING, UNSPECIFIED ESOPHAGEAL VARICES TYPE (MULTI): ICD-10-CM

## 2024-03-13 DIAGNOSIS — K21.9 GASTROESOPHAGEAL REFLUX DISEASE WITHOUT ESOPHAGITIS: ICD-10-CM

## 2024-03-13 DIAGNOSIS — K70.30 ALCOHOLIC CIRRHOSIS OF LIVER WITHOUT ASCITES (MULTI): ICD-10-CM

## 2024-03-13 PROCEDURE — G0439 PPPS, SUBSEQ VISIT: HCPCS | Performed by: FAMILY MEDICINE

## 2024-03-13 PROCEDURE — 3074F SYST BP LT 130 MM HG: CPT | Performed by: FAMILY MEDICINE

## 2024-03-13 PROCEDURE — 3079F DIAST BP 80-89 MM HG: CPT | Performed by: FAMILY MEDICINE

## 2024-03-13 PROCEDURE — 3008F BODY MASS INDEX DOCD: CPT | Performed by: FAMILY MEDICINE

## 2024-03-13 RX ORDER — PREDNISONE 20 MG/1
40 TABLET ORAL DAILY
Qty: 10 TABLET | Refills: 0 | Status: SHIPPED | OUTPATIENT
Start: 2024-03-13 | End: 2024-03-18

## 2024-03-13 RX ORDER — PANTOPRAZOLE SODIUM 40 MG/1
40 TABLET, DELAYED RELEASE ORAL
Qty: 90 TABLET | Refills: 1 | Status: SHIPPED | OUTPATIENT
Start: 2024-03-13

## 2024-03-13 RX ORDER — IBUPROFEN 200 MG
1 TABLET ORAL EVERY 24 HOURS
Qty: 30 PATCH | Refills: 0 | Status: SHIPPED | OUTPATIENT
Start: 2024-03-13 | End: 2024-04-12

## 2024-03-13 RX ORDER — ALBUTEROL SULFATE 90 UG/1
1 AEROSOL, METERED RESPIRATORY (INHALATION) EVERY 6 HOURS PRN
Qty: 8 G | Refills: 5 | Status: SHIPPED | OUTPATIENT
Start: 2024-03-13 | End: 2024-06-04 | Stop reason: SDUPTHER

## 2024-03-13 RX ORDER — AZITHROMYCIN 250 MG/1
TABLET, FILM COATED ORAL
Qty: 6 TABLET | Refills: 0 | Status: SHIPPED | OUTPATIENT
Start: 2024-03-13

## 2024-03-13 RX ORDER — LANOLIN ALCOHOL/MO/W.PET/CERES
100 CREAM (GRAM) TOPICAL DAILY
Qty: 90 TABLET | Refills: 3 | Status: SHIPPED | OUTPATIENT
Start: 2024-03-13

## 2024-03-13 ASSESSMENT — ACTIVITIES OF DAILY LIVING (ADL)
TAKING_MEDICATION: INDEPENDENT
BATHING: INDEPENDENT
DRESSING: INDEPENDENT
MANAGING_FINANCES: INDEPENDENT
GROCERY_SHOPPING: INDEPENDENT
DOING_HOUSEWORK: INDEPENDENT

## 2024-03-13 ASSESSMENT — ENCOUNTER SYMPTOMS
OCCASIONAL FEELINGS OF UNSTEADINESS: 0
LOSS OF SENSATION IN FEET: 0
DEPRESSION: 0

## 2024-03-13 NOTE — PROGRESS NOTES
"Subjective   Reason for Visit: Tin Mendoza is an 53 y.o. male here for a Medicare Wellness visit.     Past Medical, Surgical, and Family History reviewed and updated in chart.    Reviewed all medications by prescribing practitioner or clinical pharmacist (such as prescriptions, OTCs, herbal therapies and supplements) and documented in the medical record.    HPI  Here for medicare annual visit  Still smoking   Also has been having chest pressure off and on for months, has a burning sensation also.  Better in last week, pain radiates up his chest. Been out of his pantoprazole. Does have appt with cardiology for follow up   Has been having cough, sob and been wheezing. Still smoking  Has cirrhosis and esophageal varices, been awhile since he saw hepatologist.     Patient Care Team:  Noe Faust MD as PCP - General  Noe Faust MD as PCP - [facility]     Review of Systems  General: no fever  Eyes: no blurry vision  ENT: no sore throat, no ear pain  Resp: see HPI  Cardio: see HPI   Abd: no nausea/vomiting  : no dysuria, no increased urinary frequency    Objective   Vitals:  /81   Pulse 74   Temp 36.1 °C (97 °F)   Ht 1.67 m (5' 5.75\")   Wt 86.2 kg (190 lb)   SpO2 97%   BMI 30.90 kg/m²       Physical Exam  Gen: NAD, alert  Head: normocephalic/atraumatic  Eyes: conjunctivae normal  Ears: canals clear bilaterally, TM normal   Nose: external nose normal   Oropharynx: clear   Resp: Clear to auscultation  CVS: Regular rate and rhythm  Abdomen: soft, NT, ND  Ext: no edema, NT of lower extremities  Neuro: gait normal     Assessment/Plan   Problem List Items Addressed This Visit       Cirrhosis (CMS/HCC)  follow up with GI    Esophageal varices (CMS/HCC)  Follow up with GI    GERD (gastroesophageal reflux disease)    Relevant Medications    pantoprazole (ProtoNix) 40 mg EC tablet    Pulmonary artery hypertension (CMS/HCC)  Chest pain  Follow up with cardiology "     Other Visit Diagnoses       Medicare annual wellness visit, subsequent    -  Primary    Chronic obstructive pulmonary disease with acute exacerbation (CMS/[facility])        Relevant Medications    albuterol (Ventolin HFA) 90 mcg/actuation inhaler    azithromycin (Zithromax) 250 mg tablet  Prednisone 20mg    Other Relevant Orders    Spirometry    Nicotine dependence, uncomplicated, unspecified nicotine product type        Relevant Medications    nicotine (Nicoderm CQ) 21 mg/24 hr patch

## 2024-04-10 ENCOUNTER — TELEPHONE (OUTPATIENT)
Dept: PRIMARY CARE | Facility: CLINIC | Age: 54
End: 2024-04-10
Payer: MEDICARE

## 2024-04-10 DIAGNOSIS — E83.42 HYPOMAGNESEMIA: Primary | ICD-10-CM

## 2024-04-10 RX ORDER — LANOLIN ALCOHOL/MO/W.PET/CERES
400 CREAM (GRAM) TOPICAL 2 TIMES DAILY
Qty: 60 TABLET | Refills: 11 | Status: SHIPPED | OUTPATIENT
Start: 2024-04-10 | End: 2025-04-10

## 2024-04-10 NOTE — TELEPHONE ENCOUNTER
Please refill magnesium oxide (Mag-Ox) 400 mg (241.3 mg magnesium) tablet       Sig: Take 1 tablet (400 mg) by mouth 2 times a day.       Mirza Garcia

## 2024-04-17 DIAGNOSIS — I25.10 CORONARY ARTERY DISEASE DUE TO LIPID RICH PLAQUE: ICD-10-CM

## 2024-04-17 DIAGNOSIS — I25.83 CORONARY ARTERY DISEASE DUE TO LIPID RICH PLAQUE: ICD-10-CM

## 2024-04-17 RX ORDER — ATORVASTATIN CALCIUM 40 MG/1
40 TABLET, FILM COATED ORAL DAILY
Qty: 90 TABLET | Refills: 1 | Status: SHIPPED | OUTPATIENT
Start: 2024-04-17 | End: 2024-04-23 | Stop reason: WASHOUT

## 2024-04-17 RX ORDER — CLOPIDOGREL BISULFATE 75 MG/1
75 TABLET ORAL DAILY
Qty: 90 TABLET | Refills: 1 | Status: SHIPPED | OUTPATIENT
Start: 2024-04-17

## 2024-04-22 ENCOUNTER — TELEPHONE (OUTPATIENT)
Dept: PRIMARY CARE | Facility: CLINIC | Age: 54
End: 2024-04-22
Payer: MEDICARE

## 2024-04-23 RX ORDER — ROSUVASTATIN CALCIUM 5 MG/1
5 TABLET, COATED ORAL DAILY
COMMUNITY

## 2024-04-23 NOTE — TELEPHONE ENCOUNTER
Is he getting crestor from his cardiologist? We didn't have it in his chart, I added it now. They are both the same type of medication, if he is taking crestor, he does not need to be on lipitor, will take off his list.   Thanks  Two voicemails left for patient.    
Pharmacy would not fill his lipitor, he is currently on 5mg Crestor once daily.  Does the Lipitor replace the Crestor?  
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 Pt feels comfortable restarting Dupixent at this time.\\n\\nRecommend pt see an allergist for further evaluation, management of her ocular allergy symptoms. Patient was provided a list of allergist in the area that accept her insurance. Pt will reach out to those allergist and schedule a consult appt for further evaluation and management of her ocular allergy  symptoms.
Render Risk Assessment In Note?: yes
Detail Level: Detailed

## 2024-06-04 DIAGNOSIS — J44.1 CHRONIC OBSTRUCTIVE PULMONARY DISEASE WITH ACUTE EXACERBATION (MULTI): ICD-10-CM

## 2024-06-04 RX ORDER — ALBUTEROL SULFATE 90 UG/1
1 AEROSOL, METERED RESPIRATORY (INHALATION) EVERY 6 HOURS PRN
Qty: 8 G | Refills: 5 | Status: SHIPPED | OUTPATIENT
Start: 2024-06-04 | End: 2025-06-04

## 2024-06-26 ENCOUNTER — LAB (OUTPATIENT)
Dept: LAB | Facility: LAB | Age: 54
End: 2024-06-26
Payer: MEDICARE

## 2024-06-26 ENCOUNTER — APPOINTMENT (OUTPATIENT)
Dept: PRIMARY CARE | Facility: CLINIC | Age: 54
End: 2024-06-26
Payer: MEDICARE

## 2024-06-26 VITALS
WEIGHT: 184.4 LBS | SYSTOLIC BLOOD PRESSURE: 96 MMHG | DIASTOLIC BLOOD PRESSURE: 59 MMHG | BODY MASS INDEX: 29.63 KG/M2 | HEIGHT: 66 IN | TEMPERATURE: 97 F | HEART RATE: 62 BPM | OXYGEN SATURATION: 98 %

## 2024-06-26 DIAGNOSIS — F17.210 CIGARETTE NICOTINE DEPENDENCE, UNCOMPLICATED: ICD-10-CM

## 2024-06-26 DIAGNOSIS — K70.30 ALCOHOLIC CIRRHOSIS OF LIVER WITHOUT ASCITES (MULTI): ICD-10-CM

## 2024-06-26 DIAGNOSIS — M25.50 MULTIPLE JOINT PAIN: ICD-10-CM

## 2024-06-26 DIAGNOSIS — M25.50 MULTIPLE JOINT PAIN: Primary | ICD-10-CM

## 2024-06-26 LAB
ALBUMIN SERPL BCP-MCNC: 4.2 G/DL (ref 3.4–5)
ALP SERPL-CCNC: 50 U/L (ref 33–120)
ALT SERPL W P-5'-P-CCNC: 16 U/L (ref 10–52)
ANION GAP SERPL CALC-SCNC: 11 MMOL/L (ref 10–20)
AST SERPL W P-5'-P-CCNC: 21 U/L (ref 9–39)
BILIRUB SERPL-MCNC: 0.4 MG/DL (ref 0–1.2)
BUN SERPL-MCNC: 14 MG/DL (ref 6–23)
CALCIUM SERPL-MCNC: 9.4 MG/DL (ref 8.6–10.3)
CHLORIDE SERPL-SCNC: 106 MMOL/L (ref 98–107)
CK SERPL-CCNC: 219 U/L (ref 0–325)
CO2 SERPL-SCNC: 25 MMOL/L (ref 21–32)
CREAT SERPL-MCNC: 0.77 MG/DL (ref 0.5–1.3)
EGFRCR SERPLBLD CKD-EPI 2021: >90 ML/MIN/1.73M*2
GLUCOSE SERPL-MCNC: 98 MG/DL (ref 74–99)
MAGNESIUM SERPL-MCNC: 1.97 MG/DL (ref 1.6–2.4)
POTASSIUM SERPL-SCNC: 4.3 MMOL/L (ref 3.5–5.3)
PROT SERPL-MCNC: 7.1 G/DL (ref 6.4–8.2)
SODIUM SERPL-SCNC: 138 MMOL/L (ref 136–145)

## 2024-06-26 PROCEDURE — 87476 LYME DIS DNA AMP PROBE: CPT

## 2024-06-26 PROCEDURE — 99213 OFFICE O/P EST LOW 20 MIN: CPT | Performed by: FAMILY MEDICINE

## 2024-06-26 PROCEDURE — 82105 ALPHA-FETOPROTEIN SERUM: CPT

## 2024-06-26 PROCEDURE — 86235 NUCLEAR ANTIGEN ANTIBODY: CPT

## 2024-06-26 PROCEDURE — 36415 COLL VENOUS BLD VENIPUNCTURE: CPT

## 2024-06-26 PROCEDURE — 3078F DIAST BP <80 MM HG: CPT | Performed by: FAMILY MEDICINE

## 2024-06-26 PROCEDURE — 86038 ANTINUCLEAR ANTIBODIES: CPT

## 2024-06-26 PROCEDURE — 3074F SYST BP LT 130 MM HG: CPT | Performed by: FAMILY MEDICINE

## 2024-06-26 PROCEDURE — 86225 DNA ANTIBODY NATIVE: CPT

## 2024-06-26 ASSESSMENT — ENCOUNTER SYMPTOMS
LOSS OF SENSATION IN FEET: 0
DEPRESSION: 0
OCCASIONAL FEELINGS OF UNSTEADINESS: 0

## 2024-06-26 ASSESSMENT — PATIENT HEALTH QUESTIONNAIRE - PHQ9
SUM OF ALL RESPONSES TO PHQ9 QUESTIONS 1 AND 2: 0
1. LITTLE INTEREST OR PLEASURE IN DOING THINGS: NOT AT ALL
2. FEELING DOWN, DEPRESSED OR HOPELESS: NOT AT ALL

## 2024-06-26 NOTE — PROGRESS NOTES
"Subjective   Patient ID: Tin Mendoza is a 53 y.o. male who presents for Follow-up (Not getting any better, joints muscles hurt all the time/Needs disability paper filled out ).    HPI  Here for follow up  Needs renewal of his disability papers filled out.   Has been years since he followed up with GI for cirrhosis.   Has been having multiple joint pain, shoulders, knees, back, all ache when he does any type of activity. Has to rest for a day. Is on crestor will stop for a month and see if pain improves.   Still smoking, has 30 pack year history of smoking, due for his annual CT scan    Review of Systems  General: no fever  Eyes: no blurry vision  ENT: no sore throat, no ear pain  Resp: no cough, sob or wheezing  Cardio: no chest pain, no palpitations  Abd: no nausea/vomiting  : no dysuria, no increased urinary frequency      BP 96/59   Pulse 62   Temp 36.1 °C (97 °F)   Ht 1.67 m (5' 5.75\")   Wt 83.6 kg (184 lb 6.4 oz)   SpO2 98%   BMI 29.99 kg/m²       Objective   Physical Exam  Gen: NAD, alert  Head: normocephalic/atraumatic  Eyes: conjunctivae normal  Ears: canals clear bilaterally, TM normal   Nose: external nose normal   Oropharynx: clear   Resp: Clear to auscultation  CVS: Regular rate and rhythm  Abdomen: soft, NT, ND  Ext: no edema, NT of lower extremities  Neuro: gait normal     Assessment/Plan   Problem List Items Addressed This Visit       Cirrhosis (Multi)    Relevant Orders    Alpha-Fetoprotein (Completed)     Other Visit Diagnoses       Multiple joint pain    -  Primary    Relevant Orders    SOFIA + TOI Panel    Lyme disease, PCR    Creatine Kinase (Completed)    Comprehensive Metabolic Panel (Completed)    Magnesium (Completed)    Cigarette nicotine dependence, uncomplicated        Relevant Orders    CT lung screening low dose               "

## 2024-06-27 LAB
AFP SERPL-MCNC: 4 NG/ML (ref 0–9)
CENTROMERE B AB SER-ACNC: <0.2 AI
CHROMATIN AB SERPL-ACNC: <0.2 AI
DSDNA AB SER-ACNC: <1 IU/ML
ENA JO1 AB SER QL IA: <0.2 AI
ENA RNP AB SER IA-ACNC: <0.2 AI
ENA SCL70 AB SER QL IA: <0.2 AI
ENA SM AB SER IA-ACNC: <0.2 AI
ENA SM+RNP AB SER QL IA: <0.2 AI
ENA SS-A AB SER IA-ACNC: <0.2 AI
ENA SS-B AB SER IA-ACNC: <0.2 AI
RIBOSOMAL P AB SER-ACNC: <0.2 AI

## 2024-06-28 LAB — ANA SER QL HEP2 SUBST: NEGATIVE

## 2024-06-29 LAB
B BURGDOR DNA SPEC QL NAA+PROBE: NOT DETECTED
SPECIMEN SOURCE: NORMAL

## 2024-07-17 ENCOUNTER — HOSPITAL ENCOUNTER (OUTPATIENT)
Dept: RADIOLOGY | Facility: HOSPITAL | Age: 54
Discharge: HOME | End: 2024-07-17
Payer: MEDICARE

## 2024-07-17 DIAGNOSIS — F17.210 CIGARETTE NICOTINE DEPENDENCE, UNCOMPLICATED: ICD-10-CM

## 2024-07-17 PROCEDURE — 71271 CT THORAX LUNG CANCER SCR C-: CPT

## 2024-07-19 DIAGNOSIS — I27.21 PULMONARY ARTERY HYPERTENSION (MULTI): ICD-10-CM

## 2024-07-19 RX ORDER — FUROSEMIDE 40 MG/1
40 TABLET ORAL DAILY
Qty: 90 TABLET | Refills: 1 | Status: SHIPPED | OUTPATIENT
Start: 2024-07-19

## 2024-08-30 DIAGNOSIS — J44.1 CHRONIC OBSTRUCTIVE PULMONARY DISEASE WITH ACUTE EXACERBATION (MULTI): ICD-10-CM

## 2024-08-30 RX ORDER — ALBUTEROL SULFATE 90 UG/1
1 INHALANT RESPIRATORY (INHALATION) EVERY 6 HOURS PRN
Qty: 8 G | Refills: 5 | Status: SHIPPED | OUTPATIENT
Start: 2024-08-30 | End: 2025-08-30

## 2024-09-21 ENCOUNTER — APPOINTMENT (OUTPATIENT)
Dept: RADIOLOGY | Facility: HOSPITAL | Age: 54
End: 2024-09-21
Payer: MEDICARE

## 2024-09-21 ENCOUNTER — HOSPITAL ENCOUNTER (EMERGENCY)
Facility: HOSPITAL | Age: 54
Discharge: HOME | End: 2024-09-21
Attending: EMERGENCY MEDICINE
Payer: MEDICARE

## 2024-09-21 ENCOUNTER — APPOINTMENT (OUTPATIENT)
Dept: CARDIOLOGY | Facility: HOSPITAL | Age: 54
End: 2024-09-21
Payer: MEDICARE

## 2024-09-21 VITALS
RESPIRATION RATE: 14 BRPM | WEIGHT: 180 LBS | DIASTOLIC BLOOD PRESSURE: 61 MMHG | TEMPERATURE: 97.1 F | HEART RATE: 67 BPM | BODY MASS INDEX: 30.73 KG/M2 | OXYGEN SATURATION: 99 % | SYSTOLIC BLOOD PRESSURE: 138 MMHG | HEIGHT: 64 IN

## 2024-09-21 DIAGNOSIS — K20.90 ESOPHAGITIS: Primary | ICD-10-CM

## 2024-09-21 LAB
ALBUMIN SERPL BCP-MCNC: 4.3 G/DL (ref 3.4–5)
ALP SERPL-CCNC: 52 U/L (ref 33–120)
ALT SERPL W P-5'-P-CCNC: 12 U/L (ref 10–52)
ANION GAP SERPL CALC-SCNC: 9 MMOL/L (ref 10–20)
AST SERPL W P-5'-P-CCNC: 17 U/L (ref 9–39)
BASOPHILS # BLD AUTO: 0.04 X10*3/UL (ref 0–0.1)
BASOPHILS NFR BLD AUTO: 0.3 %
BILIRUB SERPL-MCNC: 0.5 MG/DL (ref 0–1.2)
BUN SERPL-MCNC: 15 MG/DL (ref 6–23)
CALCIUM SERPL-MCNC: 9.3 MG/DL (ref 8.6–10.3)
CARDIAC TROPONIN I PNL SERPL HS: <3 NG/L (ref 0–20)
CARDIAC TROPONIN I PNL SERPL HS: <3 NG/L (ref 0–20)
CHLORIDE SERPL-SCNC: 104 MMOL/L (ref 98–107)
CO2 SERPL-SCNC: 26 MMOL/L (ref 21–32)
CREAT SERPL-MCNC: 0.85 MG/DL (ref 0.5–1.3)
D DIMER PPP FEU-MCNC: 905 NG/ML FEU
EGFRCR SERPLBLD CKD-EPI 2021: >90 ML/MIN/1.73M*2
EOSINOPHIL # BLD AUTO: 0.53 X10*3/UL (ref 0–0.7)
EOSINOPHIL NFR BLD AUTO: 4 %
ERYTHROCYTE [DISTWIDTH] IN BLOOD BY AUTOMATED COUNT: 14.8 % (ref 11.5–14.5)
GLUCOSE SERPL-MCNC: 98 MG/DL (ref 74–99)
HCT VFR BLD AUTO: 41.3 % (ref 41–52)
HGB BLD-MCNC: 13.7 G/DL (ref 13.5–17.5)
IMM GRANULOCYTES # BLD AUTO: 0.06 X10*3/UL (ref 0–0.7)
IMM GRANULOCYTES NFR BLD AUTO: 0.5 % (ref 0–0.9)
LYMPHOCYTES # BLD AUTO: 2.92 X10*3/UL (ref 1.2–4.8)
LYMPHOCYTES NFR BLD AUTO: 22.2 %
MCH RBC QN AUTO: 32.6 PG (ref 26–34)
MCHC RBC AUTO-ENTMCNC: 33.2 G/DL (ref 32–36)
MCV RBC AUTO: 98 FL (ref 80–100)
MONOCYTES # BLD AUTO: 1.05 X10*3/UL (ref 0.1–1)
MONOCYTES NFR BLD AUTO: 8 %
NEUTROPHILS # BLD AUTO: 8.56 X10*3/UL (ref 1.2–7.7)
NEUTROPHILS NFR BLD AUTO: 65 %
NRBC BLD-RTO: 0 /100 WBCS (ref 0–0)
PLATELET # BLD AUTO: 188 X10*3/UL (ref 150–450)
POTASSIUM SERPL-SCNC: 3.9 MMOL/L (ref 3.5–5.3)
PROT SERPL-MCNC: 7.1 G/DL (ref 6.4–8.2)
RBC # BLD AUTO: 4.2 X10*6/UL (ref 4.5–5.9)
S PYO DNA THROAT QL NAA+PROBE: NOT DETECTED
SARS-COV-2 RNA RESP QL NAA+PROBE: NOT DETECTED
SODIUM SERPL-SCNC: 135 MMOL/L (ref 136–145)
WBC # BLD AUTO: 13.2 X10*3/UL (ref 4.4–11.3)

## 2024-09-21 PROCEDURE — 87635 SARS-COV-2 COVID-19 AMP PRB: CPT | Performed by: EMERGENCY MEDICINE

## 2024-09-21 PROCEDURE — 85025 COMPLETE CBC W/AUTO DIFF WBC: CPT | Performed by: EMERGENCY MEDICINE

## 2024-09-21 PROCEDURE — 99285 EMERGENCY DEPT VISIT HI MDM: CPT | Mod: 25

## 2024-09-21 PROCEDURE — 93005 ELECTROCARDIOGRAM TRACING: CPT

## 2024-09-21 PROCEDURE — 71045 X-RAY EXAM CHEST 1 VIEW: CPT

## 2024-09-21 PROCEDURE — 2550000001 HC RX 255 CONTRASTS: Performed by: EMERGENCY MEDICINE

## 2024-09-21 PROCEDURE — 36415 COLL VENOUS BLD VENIPUNCTURE: CPT | Performed by: EMERGENCY MEDICINE

## 2024-09-21 PROCEDURE — 71275 CT ANGIOGRAPHY CHEST: CPT | Mod: FOREIGN READ | Performed by: RADIOLOGY

## 2024-09-21 PROCEDURE — 71045 X-RAY EXAM CHEST 1 VIEW: CPT | Mod: FOREIGN READ | Performed by: RADIOLOGY

## 2024-09-21 PROCEDURE — 80053 COMPREHEN METABOLIC PANEL: CPT | Performed by: EMERGENCY MEDICINE

## 2024-09-21 PROCEDURE — 84484 ASSAY OF TROPONIN QUANT: CPT | Performed by: EMERGENCY MEDICINE

## 2024-09-21 PROCEDURE — 85379 FIBRIN DEGRADATION QUANT: CPT | Performed by: EMERGENCY MEDICINE

## 2024-09-21 PROCEDURE — 71275 CT ANGIOGRAPHY CHEST: CPT

## 2024-09-21 PROCEDURE — 87651 STREP A DNA AMP PROBE: CPT | Performed by: EMERGENCY MEDICINE

## 2024-09-21 RX ORDER — ALUMINUM HYDROXIDE, MAGNESIUM HYDROXIDE, AND SIMETHICONE 1200; 120; 1200 MG/30ML; MG/30ML; MG/30ML
15 SUSPENSION ORAL 3 TIMES DAILY
Qty: 355 ML | Refills: 0 | Status: SHIPPED | OUTPATIENT
Start: 2024-09-21 | End: 2024-10-01

## 2024-09-21 RX ORDER — FAMOTIDINE 20 MG/1
20 TABLET, FILM COATED ORAL 2 TIMES DAILY
Qty: 30 TABLET | Refills: 0 | Status: SHIPPED | OUTPATIENT
Start: 2024-09-21 | End: 2024-10-06

## 2024-09-21 RX ADMIN — IOHEXOL 75 ML: 350 INJECTION, SOLUTION INTRAVENOUS at 12:29

## 2024-09-21 ASSESSMENT — PAIN DESCRIPTION - PAIN TYPE: TYPE: ACUTE PAIN

## 2024-09-21 ASSESSMENT — COLUMBIA-SUICIDE SEVERITY RATING SCALE - C-SSRS
2. HAVE YOU ACTUALLY HAD ANY THOUGHTS OF KILLING YOURSELF?: NO
6. HAVE YOU EVER DONE ANYTHING, STARTED TO DO ANYTHING, OR PREPARED TO DO ANYTHING TO END YOUR LIFE?: NO
1. IN THE PAST MONTH, HAVE YOU WISHED YOU WERE DEAD OR WISHED YOU COULD GO TO SLEEP AND NOT WAKE UP?: NO

## 2024-09-21 ASSESSMENT — PAIN DESCRIPTION - LOCATION
LOCATION_2: THROAT
LOCATION: CHEST

## 2024-09-21 ASSESSMENT — PAIN DESCRIPTION - ORIENTATION
ORIENTATION_2: RIGHT;LEFT
ORIENTATION: MID

## 2024-09-21 ASSESSMENT — PAIN SCALES - GENERAL
PAINLEVEL_OUTOF10: 6
PAINLEVEL_OUTOF10: 6

## 2024-09-21 ASSESSMENT — PAIN DESCRIPTION - DESCRIPTORS
DESCRIPTORS: NAGGING
DESCRIPTORS_2: BURNING;SHARP

## 2024-09-21 ASSESSMENT — PAIN - FUNCTIONAL ASSESSMENT: PAIN_FUNCTIONAL_ASSESSMENT: 0-10

## 2024-09-21 ASSESSMENT — PAIN DESCRIPTION - PROGRESSION: CLINICAL_PROGRESSION: GRADUALLY WORSENING

## 2024-09-21 ASSESSMENT — PAIN DESCRIPTION - ONSET: ONSET: ONGOING

## 2024-09-21 ASSESSMENT — PAIN DESCRIPTION - FREQUENCY: FREQUENCY: INTERMITTENT

## 2024-09-21 NOTE — DISCHARGE INSTRUCTIONS
Pepcid and Maalox as prescribed.  Please take your last dose of Maalox 1 hour before bedtime.    Call your doctor Monday morning for outpatient follow-up later in the week.    You may require referral to GI for endoscopy if not improved within 10 days.    Return for worsening symptoms or concerns.

## 2024-09-21 NOTE — ED PROVIDER NOTES
[facility]  ED  Provider Note  9/21/2024  9:05 AM  ENT11/ENT11              Chief Complaint   Patient presents with    Chest Pain     Pt here with complaints of chest pain and throat pain intermittently since Wednesday. Says he had sinus congestion earlier in the week but that has cleared up. Denies shortness of breath, n/v, fevers/chills.          History of Present Illness:   Tin Mendoza is a 54 y.o. male presenting to the ED for Chest pain and throat pain, beginning 3 days ago.  The complaint has been Intermittent, moderate in severity, and worsened by Swallowing.  Patient had a minor sinus infection about a week ago.  He now has a sore throat make it difficult to swallow along with an occasional cough.  Patient states he is awake at night by coughing and chest discomfort.  She discomfort as a burning sensation in his lower chest and accompanied by coughing.  The patient has a history of hiatal hernia in the past with previous reflux problems.  He denies any known COVID exposure.  He has no pain in his arms or jaw.  He has no diaphoresis or nausea.  He does have an elevated calcium score.  He denies alcohol abuse, smoking, elevated cholesterol or positive family history.      Review of Systems:   Pertinent positives and review of systems as noted above.  Remaining 10 review of systems is negative or noncontributory to today's episode of care.  Review of Systems       --------------------------------------------- PAST HISTORY ---------------------------------------------  Past Medical History:   Diagnosis Date    Acute gastritis 01/31/2023    Bilateral carpal tunnel syndrome 01/31/2023    Carpal tunnel syndrome, bilateral upper limbs 12/07/2021    Bilateral carpal tunnel syndrome    Cholelithiasis 01/31/2023    Closed nondisplaced fracture of left patella with routine healing 01/31/2023    Diverticulosis of large intestine without perforation or abscess without bleeding     Diverticulosis of colon  without diverticulitis    Elevated coronary artery calcium score 01/31/2023    Gastro-esophageal reflux disease without esophagitis 06/20/2022    GERD (gastroesophageal reflux disease)    GI bleed     Mild intermittent asthma with (acute) exacerbation ([facility]) 01/31/2018    Mild intermittent asthma with acute exacerbation    Muscle wasting and atrophy, not elsewhere classified, unspecified site     Muscle wasting         has a past surgical history that includes No past surgeries.     reports that he has been smoking cigarettes. He has a 30 pack-year smoking history. He uses smokeless tobacco. He reports that he does not drink alcohol and does not use drugs.    family history includes Coronary artery disease in his mother; Diabetes in his mother; Heart attack in his mother; Hypertension in his mother; Lung cancer in his father and another family member; Prostate cancer in his father. Unless otherwise noted, family history is non contributory    Patient's Medications   New Prescriptions    No medications on file   Previous Medications    ALBUTEROL (VENTOLIN HFA) 90 MCG/ACTUATION INHALER    Inhale 1 puff every 6 hours if needed for wheezing or shortness of breath.    AMLODIPINE (NORVASC) 5 MG TABLET    Take 1 tablet (5 mg) by mouth once daily.    CLOPIDOGREL (PLAVIX) 75 MG TABLET    Take 1 tablet (75 mg) by mouth once daily.    FERROUS SULFATE (IRON ORAL)        FUROSEMIDE (LASIX) 40 MG TABLET    Take 1 tablet (40 mg) by mouth once daily.    LUTEIN ORAL    Lutein CAPS   Refills: 0       Active    MAGNESIUM OXIDE (MAG-OX) 400 MG (241.3 MG MAGNESIUM) TABLET    Take 1 tablet (400 mg) by mouth 2 times a day.    NADOLOL (CORGARD) 20 MG TABLET    Take 1 tablet (20 mg) by mouth once daily.    NICOTINE (NICODERM CQ) 21 MG/24 HR PATCH    Place 1 patch over 24 hours on the skin once every 24 hours.    NORTRIPTYLINE (PAMELOR) 10 MG CAPSULE    Take by mouth at bedtime. Take 1 to 2 capsules    PANTOPRAZOLE (PROTONIX) 40 MG EC  TABLET    Take 1 tablet (40 mg) by mouth once daily in the morning. Take before meals.    POTASSIUM CHLORIDE CR 20 MEQ ER TABLET    Take 1 tablet (20 mEq) by mouth once daily.    THIAMINE (VITAMIN B-1) 100 MG TABLET    Take 1 tablet (100 mg) by mouth once daily.    TIZANIDINE (ZANAFLEX) 2 MG TABLET    Take 1 tablet (2 mg) by mouth every 12 hours if needed for muscle spasms.    ZINC ORAL       Modified Medications    No medications on file   Discontinued Medications    No medications on file      The patient’s home medications have been reviewed.    Allergies: Acetaminophen, Crestor [rosuvastatin], and Nsaids (non-steroidal anti-inflammatory drug)    -------------------------------------------------- RESULTS -------------------------------------------------  All laboratory and radiology results have been personally reviewed by myself   LABS:  Labs Reviewed   D-DIMER, VTE EXCLUSION - Abnormal       Result Value    D-Dimer, Quantitative VTE Exclusion 905 (*)     Narrative:     The VTE Exclusion D-Dimer assay is reported in ng/mL Fibrinogen Equivalent Units (FEU).    Per 's instructions for use, a value of less than 500 ng/mL (FEU) may help to exclude DVT or PE in outpatients when the assay is used with a clinical pretest probability assessment.(AE must utilize and document eCalc 'Wells Score Deep Vein Thrombosis Risk' for DVT exclusion only. Emergency Department should utilize  Guidelines for Emergency Department Use of the VTE Exclusion D-Dimer and Clinical Pretest probability assessment model for DVT or PE exclusion.)   COMPREHENSIVE METABOLIC PANEL - Abnormal    Glucose 98      Sodium 135 (*)     Potassium 3.9      Chloride 104      Bicarbonate 26      Anion Gap 9 (*)     Urea Nitrogen 15      Creatinine 0.85      eGFR >90      Calcium 9.3      Albumin 4.3      Alkaline Phosphatase 52      Total Protein 7.1      AST 17      Bilirubin, Total 0.5      ALT 12     CBC WITH AUTO DIFFERENTIAL - Abnormal     WBC 13.2 (*)     nRBC 0.0      RBC 4.20 (*)     Hemoglobin 13.7      Hematocrit 41.3      MCV 98      MCH 32.6      MCHC 33.2      RDW 14.8 (*)     Platelets 188      Neutrophils % 65.0      Immature Granulocytes %, Automated 0.5      Lymphocytes % 22.2      Monocytes % 8.0      Eosinophils % 4.0      Basophils % 0.3      Neutrophils Absolute 8.56 (*)     Immature Granulocytes Absolute, Automated 0.06      Lymphocytes Absolute 2.92      Monocytes Absolute 1.05 (*)     Eosinophils Absolute 0.53      Basophils Absolute 0.04     GROUP A STREPTOCOCCUS, PCR - Normal    Group A Strep PCR Not Detected     SARS-COV-2 PCR - Normal    Coronavirus 2019, PCR Not Detected      Narrative:     This assay has received FDA Emergency Use Authorization (EUA) and is only authorized for the duration of time that circumstances exist to justify the authorization of the emergency use of in vitro diagnostic tests for the detection of SARS-CoV-2 virus and/or diagnosis of COVID-19 infection under section 564(b)(1) of the Act, 21 U.S.C. 360bbb-3(b)(1). This assay is an in vitro diagnostic nucleic acid amplification test for the qualitative detection of SARS-CoV-2 from nasopharyngeal specimens and has been validated for use at [facility]. Negative results do not preclude COVID-19 infections and should not be used as the sole basis for diagnosis, treatment, or other management decisions.     SERIAL TROPONIN-INITIAL - Normal    Troponin I, High Sensitivity <3      Narrative:     Less than 99th percentile of normal range cutoff-  Female and children under 18 years old <14 ng/L; Male <21 ng/L: Negative  Repeat testing should be performed if clinically indicated.     Female and children under 18 years old 14-50 ng/L; Male 21-50 ng/L:  Consistent with possible cardiac damage and possible increased clinical   risk. Serial measurements may help to assess extent of myocardial damage.     >50 ng/L: Consistent with cardiac damage,  increased clinical risk and  myocardial infarction. Serial measurements may help assess extent of   myocardial damage.      NOTE: Children less than 1 year old may have higher baseline troponin   levels and results should be interpreted in conjunction with the overall   clinical context.     NOTE: Troponin I testing is performed using a different   testing methodology at [facility] than at other   [facility]. Direct result comparisons should only   be made within the same method.   SERIAL TROPONIN, 1 HOUR - Normal    Troponin I, High Sensitivity <3      Narrative:     Less than 99th percentile of normal range cutoff-  Female and children under 18 years old <14 ng/L; Male <21 ng/L: Negative  Repeat testing should be performed if clinically indicated.     Female and children under 18 years old 14-50 ng/L; Male 21-50 ng/L:  Consistent with possible cardiac damage and possible increased clinical   risk. Serial measurements may help to assess extent of myocardial damage.     >50 ng/L: Consistent with cardiac damage, increased clinical risk and  myocardial infarction. Serial measurements may help assess extent of   myocardial damage.      NOTE: Children less than 1 year old may have higher baseline troponin   levels and results should be interpreted in conjunction with the overall   clinical context.     NOTE: Troponin I testing is performed using a different   testing methodology at [facility] than at other   [facility]. Direct result comparisons should only   be made within the same method.   TROPONIN SERIES- (INITIAL, 1 HR)    Narrative:     The following orders were created for panel order Troponin I Series, High Sensitivity (0, 1 HR).  Procedure                               Abnormality         Status                     ---------                               -----------         ------                     Troponin I, High Sensiti...[[id number]]  Normal              Final result  "              Troponin, High Sensitivi...[[id number]]  Normal              Final result                 Please view results for these tests on the individual orders.   SERIAL TROPONIN, 1 HOUR       EKG:  Sinus rhythm at 74 bpm, left atrial enlargement, incomplete right bundle branch block, no acute ST elevations.  Borderline EKG.  Interpreted by CHRIS Falk MDInterpreted by CHRIS Falk MD      RADIOLOGY:  Interpreted by Radiologist.  CT angio chest for pulmonary embolism   Final Result   1. No detectable pulmonary emboli.   2. Clear lungs.   3. Likely esophageal thickening: correlate for esophagitis.   4. Remainder as above.   Signed by Armando Dumont MD      XR chest 1 view   Final Result   No acute cardiopulmonary findings.   Signed by Adeola Berry MD          ------------------------- NURSING NOTES AND VITALS REVIEWED ---------------------------   The nursing notes within the ED encounter and vital signs as below have been reviewed.   /83   Pulse 73   Temp 36.2 °C (97.1 °F) (Temporal)   Resp 16   Ht 1.626 m (5' 4\")   Wt 81.6 kg (180 lb)   SpO2 95%   BMI 30.90 kg/m²   Oxygen Saturation Interpretation: Normal      ---------------------------------------------------PHYSICAL EXAM--------------------------------------  Physical Exam   Constitutional/General: Alert and oriented x3, well appearing, non toxic in NAD  Head: Normocephalic and atraumatic  Eyes: PERRL, EOMI, conjunctiva normal, sclera non icteric  Mouth: Oropharynx clear, handling secretions, no trismus, no asymmetry of the posterior oropharynx or uvular edema  Neck: Supple, full ROM, non tender to palpation in the midline, no stridor, no crepitus, no meningeal signs  Respiratory: Lungs clear to auscultation bilaterally, no wheezes, rales, or rhonchi  Cardiovascular:  Regular rate. Regular rhythm. No murmurs, gallops, or rubs. 2+ distal pulses  Chest: No chest wall tenderness  GI:  Abdomen Soft, Non tender, Non distended.  +BS. No " organomegaly, no palpable masses,  No rebound, guarding, or rigidity. No CVAT   Musculoskeletal: Moves all extremities x 4. Warm and well perfused, no clubbing, cyanosis, or edema. Capillary refill <3 seconds  Integument: skin warm and dry. No rashes.   Lymphatic: no lymphadenopathy noted  Neurologic:  No focal deficits, symmetric strength 5/5 in the upper and lower extremities bilaterally  Psychiatric: Normal Affect    Procedures    Diagnoses as of 09/21/24 1321   Esophagitis          Medical Decision Making:   Patient has chest pain and a CT scan consistent with esophagitis.  There is no evidence of pulm embolism or mass.  He does have esophageal thickening to go with his pain.  The patient was discharged home with prescriptions for Pepcid and Maalox.  He has a follow-up with his primary care doctor for referral to GI if not better in 1 week.    He is to avoid eating after 8 PM at night and to take his last dose of Maalox at bedtime.      Counseling:   The emergency provider has spoken with the patient and discussed today’s results, in addition to providing specific details for the plan of care and counseling regarding the diagnosis and prognosis.  Questions are answered at this time and they are agreeable with the plan.      --------------------------------- IMPRESSION AND DISPOSITION ---------------------------------        IMPRESSION  1. Esophagitis        DISPOSITION  Disposition: Discharge to home  Patient condition is fair      Billing Provider Critical Care Time: 0 minutes     Juan David Falk MD  09/21/24 [id number]

## 2024-09-24 LAB
ATRIAL RATE: 74 BPM
P AXIS: 54 DEGREES
P OFFSET: 195 MS
P ONSET: 145 MS
PR INTERVAL: 152 MS
Q ONSET: 221 MS
QRS COUNT: 12 BEATS
QRS DURATION: 102 MS
QT INTERVAL: 386 MS
QTC CALCULATION(BAZETT): 428 MS
QTC FREDERICIA: 414 MS
R AXIS: 20 DEGREES
T AXIS: 52 DEGREES
T OFFSET: 414 MS
VENTRICULAR RATE: 74 BPM

## 2024-10-09 ENCOUNTER — APPOINTMENT (OUTPATIENT)
Dept: PRIMARY CARE | Facility: CLINIC | Age: 54
End: 2024-10-09
Payer: MEDICARE

## 2024-10-09 VITALS
DIASTOLIC BLOOD PRESSURE: 70 MMHG | SYSTOLIC BLOOD PRESSURE: 136 MMHG | HEART RATE: 79 BPM | OXYGEN SATURATION: 98 % | WEIGHT: 183.2 LBS | HEIGHT: 64 IN | BODY MASS INDEX: 31.28 KG/M2 | TEMPERATURE: 97.5 F

## 2024-10-09 DIAGNOSIS — E78.5 HYPERLIPIDEMIA, UNSPECIFIED HYPERLIPIDEMIA TYPE: ICD-10-CM

## 2024-10-09 DIAGNOSIS — N64.4 BREAST PAIN, LEFT: ICD-10-CM

## 2024-10-09 DIAGNOSIS — N62 GYNECOMASTIA: ICD-10-CM

## 2024-10-09 DIAGNOSIS — Z12.5 SCREENING FOR PROSTATE CANCER: ICD-10-CM

## 2024-10-09 DIAGNOSIS — K44.9 HIATAL HERNIA: ICD-10-CM

## 2024-10-09 DIAGNOSIS — M94.0 COSTOCHONDRITIS: Primary | ICD-10-CM

## 2024-10-09 DIAGNOSIS — K20.90 ESOPHAGITIS: ICD-10-CM

## 2024-10-09 PROCEDURE — 99214 OFFICE O/P EST MOD 30 MIN: CPT | Performed by: FAMILY MEDICINE

## 2024-10-09 PROCEDURE — 3078F DIAST BP <80 MM HG: CPT | Performed by: FAMILY MEDICINE

## 2024-10-09 PROCEDURE — 3008F BODY MASS INDEX DOCD: CPT | Performed by: FAMILY MEDICINE

## 2024-10-09 PROCEDURE — 3075F SYST BP GE 130 - 139MM HG: CPT | Performed by: FAMILY MEDICINE

## 2024-10-09 ASSESSMENT — PATIENT HEALTH QUESTIONNAIRE - PHQ9
1. LITTLE INTEREST OR PLEASURE IN DOING THINGS: NOT AT ALL
SUM OF ALL RESPONSES TO PHQ9 QUESTIONS 1 AND 2: 0
2. FEELING DOWN, DEPRESSED OR HOPELESS: NOT AT ALL

## 2024-10-09 NOTE — PROGRESS NOTES
"Subjective   Patient ID: Tin Mendoza is a 54 y.o. male who presents for Follow-up (3 mth. Discuss ER visit. ).  HPI  Here for follow up   Went to the ED on 9/21/2024 for chest pain, and sore throat. Had CTPE which showed lungs clear but did show esophageal thickening likely due to esophagitis. Compliant with protonix, discharged with pepcid which he has been taking. Helping.   Still smoking as well, drinks a pot of coffee a day. Advised to cut down.   Had EGD in 2021, showed hiatal hernia  Still having pain in his sternum and by his rib area. No trauma  Also his left breast has been getting more noticeably bigger compared to the right.    Review of Systems  General: no fever  Eyes: no blurry vision  ENT: no ear pain  Resp: see HPI  Cardio: see HPI  Abd: no nausea/vomiting  : no dysuria, no increased urinary frequency      /70   Pulse 79   Temp 36.4 °C (97.5 °F)   Ht 1.626 m (5' 4\")   Wt 83.1 kg (183 lb 3.2 oz)   SpO2 98%   BMI 31.45 kg/m²       Objective   Physical Exam  Gen: NAD, alert  Head: normocephalic/atraumatic  Eyes: conjunctivae normal  Ears: canals clear bilaterally, TM normal   Nose: external nose normal   Oropharynx: clear   Resp: Clear to auscultation  CVS: Regular rate and rhythm  Breast: left breast prominently larger compared to right  Abdomen: soft, NT, ND  Ext: no edema, NT of lower extremities  Neuro: gait normal       Assessment/Plan   Problem List Items Addressed This Visit       Esophagitis  Continue pepcid and protonix     Other Visit Diagnoses       Costochondritis    -  Primary  Supportive care    Hiatal hernia      Discussed lifestyle modifications to help reduce GERD, continue pepcid and protonix, if no improvement to follow up with surgery in the future    Hyperlipidemia, unspecified hyperlipidemia type        Relevant Orders    Lipid Panel    Screening for prostate cancer        Relevant Orders    Prostate Specific Antigen, Screen    Gynecomastia        Relevant Orders "    BI mammo bilateral diagnostic    Breast pain, left        Relevant Orders    BI mammo bilateral diagnostic

## 2024-10-16 PROBLEM — K44.9 HIATAL HERNIA: Status: ACTIVE | Noted: 2024-10-16

## 2024-11-04 DIAGNOSIS — E78.5 DYSLIPIDEMIA: ICD-10-CM

## 2024-11-04 RX ORDER — POTASSIUM CHLORIDE 20 MEQ/1
20 TABLET, EXTENDED RELEASE ORAL DAILY
Qty: 90 TABLET | Refills: 1 | Status: SHIPPED | OUTPATIENT
Start: 2024-11-04

## 2024-11-05 DIAGNOSIS — I25.83 CORONARY ARTERY DISEASE DUE TO LIPID RICH PLAQUE: ICD-10-CM

## 2024-11-05 DIAGNOSIS — I25.10 CORONARY ARTERY DISEASE DUE TO LIPID RICH PLAQUE: ICD-10-CM

## 2024-11-05 RX ORDER — CLOPIDOGREL BISULFATE 75 MG/1
75 TABLET ORAL DAILY
Qty: 90 TABLET | Refills: 3 | Status: SHIPPED | OUTPATIENT
Start: 2024-11-05

## 2024-12-17 ENCOUNTER — HOSPITAL ENCOUNTER (OUTPATIENT)
Dept: RADIOLOGY | Facility: HOSPITAL | Age: 54
Discharge: HOME | End: 2024-12-17
Payer: MEDICARE

## 2024-12-17 DIAGNOSIS — N62 GYNECOMASTIA: ICD-10-CM

## 2024-12-17 DIAGNOSIS — N64.4 BREAST PAIN, LEFT: ICD-10-CM

## 2024-12-17 PROCEDURE — 77066 DX MAMMO INCL CAD BI: CPT

## 2024-12-17 PROCEDURE — 77062 BREAST TOMOSYNTHESIS BI: CPT | Performed by: RADIOLOGY

## 2024-12-17 PROCEDURE — 77066 DX MAMMO INCL CAD BI: CPT | Performed by: RADIOLOGY

## 2025-02-12 ENCOUNTER — APPOINTMENT (OUTPATIENT)
Dept: PRIMARY CARE | Facility: CLINIC | Age: 55
End: 2025-02-12
Payer: MEDICARE

## 2025-02-12 VITALS
HEART RATE: 75 BPM | HEIGHT: 64 IN | SYSTOLIC BLOOD PRESSURE: 120 MMHG | TEMPERATURE: 96.3 F | BODY MASS INDEX: 31.82 KG/M2 | WEIGHT: 186.4 LBS | OXYGEN SATURATION: 99 % | DIASTOLIC BLOOD PRESSURE: 64 MMHG

## 2025-02-12 DIAGNOSIS — K70.30 ALCOHOLIC CIRRHOSIS OF LIVER WITHOUT ASCITES (MULTI): Primary | ICD-10-CM

## 2025-02-12 DIAGNOSIS — E78.2 MIXED HYPERLIPIDEMIA: ICD-10-CM

## 2025-02-12 DIAGNOSIS — J43.8 OTHER EMPHYSEMA (MULTI): ICD-10-CM

## 2025-02-12 DIAGNOSIS — I27.21 PULMONARY ARTERY HYPERTENSION (MULTI): ICD-10-CM

## 2025-02-12 DIAGNOSIS — E55.9 VITAMIN D DEFICIENCY: ICD-10-CM

## 2025-02-12 DIAGNOSIS — E83.42 HYPOMAGNESEMIA: ICD-10-CM

## 2025-02-12 DIAGNOSIS — Z12.5 SCREENING FOR PROSTATE CANCER: ICD-10-CM

## 2025-02-12 DIAGNOSIS — G58.8 INTERCOSTAL NEURALGIA: ICD-10-CM

## 2025-02-12 DIAGNOSIS — I10 HYPERTENSION, BENIGN: ICD-10-CM

## 2025-02-12 PROBLEM — J45.21 MILD INTERMITTENT ASTHMA WITH ACUTE EXACERBATION (HHS-HCC): Status: RESOLVED | Noted: 2023-01-31 | Resolved: 2025-02-12

## 2025-02-12 PROBLEM — J44.1 CHRONIC OBSTRUCTIVE PULMONARY DISEASE WITH ACUTE EXACERBATION (MULTI): Status: ACTIVE | Noted: 2025-02-12

## 2025-02-12 PROBLEM — J44.1 CHRONIC OBSTRUCTIVE PULMONARY DISEASE WITH ACUTE EXACERBATION (MULTI): Status: RESOLVED | Noted: 2025-02-12 | Resolved: 2025-02-12

## 2025-02-12 PROCEDURE — 3074F SYST BP LT 130 MM HG: CPT | Performed by: FAMILY MEDICINE

## 2025-02-12 PROCEDURE — 3078F DIAST BP <80 MM HG: CPT | Performed by: FAMILY MEDICINE

## 2025-02-12 PROCEDURE — 99214 OFFICE O/P EST MOD 30 MIN: CPT | Performed by: FAMILY MEDICINE

## 2025-02-12 PROCEDURE — 3008F BODY MASS INDEX DOCD: CPT | Performed by: FAMILY MEDICINE

## 2025-02-12 RX ORDER — CYCLOBENZAPRINE HCL 5 MG
5 TABLET ORAL NIGHTLY PRN
Qty: 30 TABLET | Refills: 2 | Status: SHIPPED | OUTPATIENT
Start: 2025-02-12

## 2025-02-12 RX ORDER — GABAPENTIN 100 MG/1
100 CAPSULE ORAL NIGHTLY
Qty: 30 CAPSULE | Refills: 5 | Status: SHIPPED | OUTPATIENT
Start: 2025-02-12 | End: 2025-08-11

## 2025-02-12 NOTE — PROGRESS NOTES
"Subjective   Patient ID: Tin Mendoza is a 54 y.o. male who presents for Follow-up (Discuss US of liver./Tightness that starts on his left side and shoots up to his neck./Starting taking Vitamin D again. )    HPI  Here for follow up   Has cirrhosis of the liver, due for annual follow up ultrasound. Also has history of pulmonary artery hypertension.  Has been having epigastric pain, which shoots up his chest and to his neck, comes and goes, no nausea/vomiting/diarrhea. Worse at night.   Had gone to the ED when it first started. CT angio showed esophageal thickening but otherwise normal. Compliant with protonix.   Following with cardiology  Has copd, no sob or wheezing  HTN under control with meds    Review of Systems  General: no fever  Eyes: no blurry vision  ENT: no sore throat, no ear pain  Resp: no cough, sob or wheezing  Cardio: see HPI  Abd: no nausea/vomiting  : no dysuria, no increased urinary frequency      /64   Pulse 75   Temp 35.7 °C (96.3 °F)   Ht 1.626 m (5' 4\")   Wt 84.6 kg (186 lb 6.4 oz)   SpO2 99%   BMI 32.00 kg/m²       Objective   Physical Exam  Gen: NAD, alert  Head: normocephalic/atraumatic  Eyes: conjunctivae normal  Ears: canals clear bilaterally, TM normal   Nose: external nose normal   Oropharynx: clear   Resp: Clear to auscultation  CVS: Regular rate and rhythm  Abdomen: soft, NT, ND  Ext: no edema, NT of lower extremities  Neuro: gait normal     Assessment/Plan   Problem List Items Addressed This Visit       Cirrhosis (Multi) - Primary    Relevant Orders    US abdomen limited liver (Completed)         Hypomagnesemia    Relevant Orders    Magnesium    Pulmonary artery hypertension (Multi)  stable    Hypertension, benign  Continue current regimen    Other emphysema (Multi)  stable     Other Visit Diagnoses       Mixed hyperlipidemia        Relevant Orders    Lipid Panel    CBC    Comprehensive Metabolic Panel    TSH with reflex to Free T4 if abnormal    Vitamin D deficiency  "       Relevant Orders    Vitamin D 25-Hydroxy,Total (for eval of Vitamin D levels)    Screening for prostate cancer        Relevant Orders    Prostate Specific Antigen, Screen    Intercostal neuralgia        Relevant Medications    gabapentin (Neurontin) 100 mg capsule    cyclobenzaprine (Flexeril) 5 mg tablet

## 2025-02-26 ENCOUNTER — HOSPITAL ENCOUNTER (OUTPATIENT)
Dept: RADIOLOGY | Facility: HOSPITAL | Age: 55
Discharge: HOME | End: 2025-02-26
Payer: MEDICARE

## 2025-02-26 DIAGNOSIS — K70.30 ALCOHOLIC CIRRHOSIS OF LIVER WITHOUT ASCITES (MULTI): ICD-10-CM

## 2025-02-26 PROCEDURE — 76705 ECHO EXAM OF ABDOMEN: CPT

## 2025-02-26 PROCEDURE — 76705 ECHO EXAM OF ABDOMEN: CPT | Performed by: STUDENT IN AN ORGANIZED HEALTH CARE EDUCATION/TRAINING PROGRAM

## 2025-03-22 LAB
25(OH)D3+25(OH)D2 SERPL-MCNC: 73 NG/ML (ref 30–100)
ALBUMIN SERPL-MCNC: 4.9 G/DL (ref 3.6–5.1)
ALP SERPL-CCNC: 48 U/L (ref 35–144)
ALT SERPL-CCNC: 17 U/L (ref 9–46)
ANION GAP SERPL CALCULATED.4IONS-SCNC: 8 MMOL/L (CALC) (ref 7–17)
AST SERPL-CCNC: 20 U/L (ref 10–35)
BILIRUB SERPL-MCNC: 0.6 MG/DL (ref 0.2–1.2)
BUN SERPL-MCNC: 15 MG/DL (ref 7–25)
CALCIUM SERPL-MCNC: 9.8 MG/DL (ref 8.6–10.3)
CHLORIDE SERPL-SCNC: 99 MMOL/L (ref 98–110)
CHOLEST SERPL-MCNC: 219 MG/DL
CHOLEST/HDLC SERPL: 4.9 (CALC)
CO2 SERPL-SCNC: 30 MMOL/L (ref 20–32)
CREAT SERPL-MCNC: 1.04 MG/DL (ref 0.7–1.3)
EGFRCR SERPLBLD CKD-EPI 2021: 86 ML/MIN/1.73M2
ERYTHROCYTE [DISTWIDTH] IN BLOOD BY AUTOMATED COUNT: 12.3 % (ref 11–15)
GLUCOSE SERPL-MCNC: 108 MG/DL (ref 65–99)
HCT VFR BLD AUTO: 43.5 % (ref 38.5–50)
HDLC SERPL-MCNC: 45 MG/DL
HGB BLD-MCNC: 14.6 G/DL (ref 13.2–17.1)
LDLC SERPL CALC-MCNC: 143 MG/DL (CALC)
MAGNESIUM SERPL-MCNC: 2 MG/DL (ref 1.5–2.5)
MCH RBC QN AUTO: 31.9 PG (ref 27–33)
MCHC RBC AUTO-ENTMCNC: 33.6 G/DL (ref 32–36)
MCV RBC AUTO: 95.2 FL (ref 80–100)
NONHDLC SERPL-MCNC: 174 MG/DL (CALC)
PLATELET # BLD AUTO: 242 THOUSAND/UL (ref 140–400)
PMV BLD REES-ECKER: 11.3 FL (ref 7.5–12.5)
POTASSIUM SERPL-SCNC: 4.2 MMOL/L (ref 3.5–5.3)
PROT SERPL-MCNC: 8 G/DL (ref 6.1–8.1)
PSA SERPL-MCNC: 3.98 NG/ML
RBC # BLD AUTO: 4.57 MILLION/UL (ref 4.2–5.8)
SODIUM SERPL-SCNC: 137 MMOL/L (ref 135–146)
TRIGL SERPL-MCNC: 179 MG/DL
TSH SERPL-ACNC: 1.91 MIU/L (ref 0.4–4.5)
WBC # BLD AUTO: 10.7 THOUSAND/UL (ref 3.8–10.8)

## 2025-03-24 DIAGNOSIS — K21.9 GASTROESOPHAGEAL REFLUX DISEASE WITHOUT ESOPHAGITIS: ICD-10-CM

## 2025-03-24 DIAGNOSIS — I27.21 PULMONARY ARTERY HYPERTENSION (MULTI): ICD-10-CM

## 2025-03-24 RX ORDER — FUROSEMIDE 40 MG/1
40 TABLET ORAL DAILY
Qty: 90 TABLET | Refills: 0 | Status: SHIPPED | OUTPATIENT
Start: 2025-03-24

## 2025-03-24 RX ORDER — PANTOPRAZOLE SODIUM 40 MG/1
40 TABLET, DELAYED RELEASE ORAL
Qty: 90 TABLET | Refills: 1 | Status: SHIPPED | OUTPATIENT
Start: 2025-03-24

## 2025-04-09 DIAGNOSIS — E51.9 VITAMIN B1 DEFICIENCY: ICD-10-CM

## 2025-04-09 RX ORDER — LANOLIN ALCOHOL/MO/W.PET/CERES
100 CREAM (GRAM) TOPICAL DAILY
Qty: 90 TABLET | Refills: 2 | Status: SHIPPED | OUTPATIENT
Start: 2025-04-09

## 2025-04-23 DIAGNOSIS — E83.42 HYPOMAGNESEMIA: ICD-10-CM

## 2025-04-24 RX ORDER — LANOLIN ALCOHOL/MO/W.PET/CERES
400 CREAM (GRAM) TOPICAL 2 TIMES DAILY
Qty: 180 TABLET | Refills: 3 | Status: SHIPPED | OUTPATIENT
Start: 2025-04-24

## 2025-05-21 ENCOUNTER — APPOINTMENT (OUTPATIENT)
Dept: PRIMARY CARE | Facility: CLINIC | Age: 55
End: 2025-05-21
Payer: MEDICARE

## 2025-05-21 VITALS
WEIGHT: 191.8 LBS | TEMPERATURE: 97.6 F | HEIGHT: 64 IN | DIASTOLIC BLOOD PRESSURE: 60 MMHG | HEART RATE: 80 BPM | OXYGEN SATURATION: 97 % | SYSTOLIC BLOOD PRESSURE: 120 MMHG | BODY MASS INDEX: 32.74 KG/M2

## 2025-05-21 DIAGNOSIS — F17.210 CIGARETTE NICOTINE DEPENDENCE WITHOUT COMPLICATION: ICD-10-CM

## 2025-05-21 DIAGNOSIS — Z00.00 MEDICARE ANNUAL WELLNESS VISIT, SUBSEQUENT: Primary | ICD-10-CM

## 2025-05-21 DIAGNOSIS — L23.7 POISON IVY DERMATITIS: ICD-10-CM

## 2025-05-21 DIAGNOSIS — H61.22 IMPACTED CERUMEN OF LEFT EAR: ICD-10-CM

## 2025-05-21 PROBLEM — I85.00 ESOPHAGEAL VARICES: Status: RESOLVED | Noted: 2023-01-31 | Resolved: 2025-05-21

## 2025-05-21 PROCEDURE — 3008F BODY MASS INDEX DOCD: CPT | Performed by: FAMILY MEDICINE

## 2025-05-21 PROCEDURE — 3078F DIAST BP <80 MM HG: CPT | Performed by: FAMILY MEDICINE

## 2025-05-21 PROCEDURE — 69209 REMOVE IMPACTED EAR WAX UNI: CPT | Performed by: FAMILY MEDICINE

## 2025-05-21 PROCEDURE — G0439 PPPS, SUBSEQ VISIT: HCPCS | Performed by: FAMILY MEDICINE

## 2025-05-21 PROCEDURE — 3074F SYST BP LT 130 MM HG: CPT | Performed by: FAMILY MEDICINE

## 2025-05-21 RX ORDER — TRIAMCINOLONE ACETONIDE 1 MG/G
CREAM TOPICAL
Qty: 30 G | Refills: 0 | Status: SHIPPED | OUTPATIENT
Start: 2025-05-21

## 2025-05-21 ASSESSMENT — ACTIVITIES OF DAILY LIVING (ADL)
DRESSING: INDEPENDENT
TAKING_MEDICATION: INDEPENDENT
DOING_HOUSEWORK: INDEPENDENT
MANAGING_FINANCES: INDEPENDENT
BATHING: INDEPENDENT
GROCERY_SHOPPING: INDEPENDENT

## 2025-05-21 NOTE — PROGRESS NOTES
"Subjective   Reason for Visit: Tin Mendoza is an 54 y.o. male here for a Medicare Wellness visit.     Past Medical, Surgical, and Family History reviewed and updated in chart.    Reviewed all medications by prescribing practitioner or clinical pharmacist (such as prescriptions, OTCs, herbal therapies and supplements) and documented in the medical record.    Chief Complaint   Patient presents with    Medicare Annual Wellness Visit Subsequent     Was doing yard work and he was wearing shorts. He noticed the next day he had bite marks on his right leg. Possible poison ivy. There was puss and no itching. Does not feel anything with.         HPI  Here for medicare annual visit  Has rash on his right upper thigh for the past week, noticed it after he was gardening. Had blistered and there was pus, nothing now.   Patient ID: Tin Mendoza is a 54 y.o. male.    Ear Cerumen Removal    Date/Time: 5/21/2025 1:31 PM    Performed by: Noe Faust MD  Authorized by: Noe Faust MD    Consent:     Consent obtained:  Verbal    Consent given by:  Patient  Universal protocol:     Patient identity confirmed:  Verbally with patient  Procedure details:     Location:  L ear    Procedure type: irrigation      Procedure outcomes: cerumen removed    Post-procedure details:     Inspection:  Ear canal clear    Procedure completion:  Tolerated well, no immediate complications         Patient Care Team:  Noe Faust MD as PCP - General     Review of Systems  General: no fever  Eyes: no blurry vision  ENT: no sore throat, no ear pain  Resp: no cough, sob or wheezing  Cardio: no chest pain, no palpitations  Abd: no nausea/vomiting  : no dysuria, no increased urinary frequency    Objective   Vitals:  /60   Pulse 80   Temp 36.4 °C (97.6 °F)   Ht 1.626 m (5' 4\")   Wt 87 kg (191 lb 12.8 oz)   SpO2 97%   BMI 32.92 kg/m²       Physical Exam  Gen: NAD, alert  Head: " normocephalic/atraumatic  Eyes: conjunctivae normal  Ears: impacted cerumen in left ear  Nose: external nose normal   Oropharynx: clear   Resp: Clear to auscultation  CVS: Regular rate and rhythm  Abdomen: soft, NT, ND  Ext: no edema, NT of lower extremities  Skin: 3 small lesions on right upper thigh appear to be poison ivy  Neuro: gait normal     Assessment & Plan  Medicare annual wellness visit, subsequent         Cigarette nicotine dependence without complication    Orders:    CT lung screening low dose; Future    Poison ivy dermatitis    Orders:    triamcinolone (Kenalog) 0.1 % cream; Apply to affected area 1-2 times daily as needed for rash. Avoid face and groin.    Impacted cerumen of left ear    Orders:    Ear Cerumen Removal

## 2025-05-23 DIAGNOSIS — I27.21 PULMONARY ARTERY HYPERTENSION (MULTI): ICD-10-CM

## 2025-05-23 RX ORDER — FUROSEMIDE 40 MG/1
40 TABLET ORAL DAILY
Qty: 90 TABLET | Refills: 1 | Status: SHIPPED | OUTPATIENT
Start: 2025-05-23

## 2025-06-02 ENCOUNTER — OFFICE VISIT (OUTPATIENT)
Dept: URGENT CARE | Age: 55
End: 2025-06-02
Payer: MEDICARE

## 2025-06-02 VITALS
BODY MASS INDEX: 30.9 KG/M2 | WEIGHT: 180 LBS | TEMPERATURE: 98.1 F | SYSTOLIC BLOOD PRESSURE: 162 MMHG | OXYGEN SATURATION: 98 % | HEART RATE: 90 BPM | DIASTOLIC BLOOD PRESSURE: 88 MMHG | RESPIRATION RATE: 20 BRPM

## 2025-06-02 DIAGNOSIS — B37.2 CANDIDAL DERMATITIS: Primary | ICD-10-CM

## 2025-06-02 LAB — POC FINGERSTICK BLOOD GLUCOSE: 86 MG/DL (ref 70–100)

## 2025-06-02 RX ORDER — DOXYCYCLINE 100 MG/1
100 CAPSULE ORAL 2 TIMES DAILY
Qty: 20 CAPSULE | Refills: 0 | Status: SHIPPED | OUTPATIENT
Start: 2025-06-02 | End: 2025-06-08

## 2025-06-02 RX ORDER — CLOTRIMAZOLE 1 %
CREAM (GRAM) TOPICAL 2 TIMES DAILY
Qty: 30 G | Refills: 0 | Status: SHIPPED | OUTPATIENT
Start: 2025-06-02 | End: 2025-06-16

## 2025-06-02 RX ORDER — MUPIROCIN 20 MG/G
1 OINTMENT TOPICAL 2 TIMES DAILY
Qty: 22 G | Refills: 0 | Status: SHIPPED | OUTPATIENT
Start: 2025-06-02 | End: 2025-06-16

## 2025-06-02 NOTE — PROGRESS NOTES
Subjective   Patient ID: Tin Mendoza is a 54 y.o. male. They present today with a chief complaint of Rash (Pt states rash and some sort of secretions from buttocks/tailbone area x 4 days).    History of Present Illness  54-year-old male presents urgent care with rash on his buttocks started 4 days ago.  Denies any current fevers or chills, nausea vomiting or sweats.  Denies history of diabetes.  States he does have liver cirrhosis.  Has well-demarcated erythematous rash with satellite lesions in the intergluteal fold as well as the buttock crease resembling Candida dermatitis.  Patient states that is also oozing.  There is no significant tenderness, fluctuance, induration, crepitus to palpation.  No obvious open lesions.  Patient is nontoxic-appearing in no acute distress.  Obtained wound culture.  Fingerstick glucose is 86.  Prescribed clotrimazole, mupirocin, doxycycline.  Placed wound care referral.  Instructed to call PCP to schedule close follow-up ointment.  Educated to have low threshold to go to the emergency department if worsening symptoms or develops any systemic symptoms.  Patient expresses understanding and agrees with plan.  I did discuss patient with supervising physician Dr. Bartlett who also agrees with plan.          Past Medical History  Allergies as of 06/02/2025 - Reviewed 06/02/2025   Allergen Reaction Noted    Acetaminophen Unknown 01/31/2023    Crestor [rosuvastatin] Myalgia 06/27/2024    Nsaids (non-steroidal anti-inflammatory drug) Unknown 01/31/2023       Prescriptions Prior to Admission[1]     Medical History[2]    Surgical History[3]     reports that he has been smoking cigarettes. He started smoking about 30 years ago. He has a 30 pack-year smoking history. He uses smokeless tobacco. He reports that he does not drink alcohol and does not use drugs.    Review of Systems  Review of Systems   All other systems reviewed and are negative.                                 Objective    Vitals:     06/02/25 1316   BP: 162/88   BP Location: Left arm   Patient Position: Standing   BP Cuff Size: Adult   Pulse: 90   Resp: 20   Temp: 36.7 °C (98.1 °F)   TempSrc: Oral   SpO2: 98%   Weight: 81.6 kg (180 lb)     No LMP for male patient.    Physical Exam  Vitals reviewed. Exam conducted with a chaperone present.   Constitutional:       General: He is not in acute distress.     Appearance: Normal appearance. He is not ill-appearing, toxic-appearing or diaphoretic.   HENT:      Head: Normocephalic and atraumatic.      Nose: Nose normal.   Pulmonary:      Effort: Pulmonary effort is normal. No respiratory distress.   Abdominal:      General: Abdomen is flat.      Palpations: Abdomen is soft.      Tenderness: There is no abdominal tenderness. There is no right CVA tenderness or left CVA tenderness.   Genitourinary:     Comments: Well demarcated erythematous rash with satellite lesions of the intergluteal fold and buttock crease.  No induration, significant tenderness, fluctuance, crepitus to palpation.  No obvious open lesions.  No active bleeding.  Musculoskeletal:      Cervical back: Normal range of motion and neck supple. No rigidity or tenderness.   Lymphadenopathy:      Cervical: No cervical adenopathy.   Skin:     General: Skin is warm and dry.   Neurological:      General: No focal deficit present.      Mental Status: He is alert and oriented to person, place, and time.   Psychiatric:         Mood and Affect: Mood normal.         Behavior: Behavior normal.         Procedures    Point of Care Test & Imaging Results from this visit  No results found for this visit on 06/02/25.   Imaging  No results found.    Cardiology, Vascular, and Other Imaging  No other imaging results found for the past 2 days      Diagnostic study results (if any) were reviewed by Paulo Zamudio PA-C.    Assessment/Plan   Allergies, medications, history, and pertinent labs/EKGs/Imaging reviewed by Paulo Zamudio PA-C.     Medical  Decision Making  54-year-old male presents urgent care with rash on his buttocks started 4 days ago.  Denies any current fevers or chills, nausea vomiting or sweats.  Denies history of diabetes.  States he does have liver cirrhosis.  Has well-demarcated erythematous rash with satellite lesions in the intergluteal fold as well as the buttock crease resembling Candida dermatitis.  Patient states that is also oozing.  There is no significant tenderness, fluctuance, induration, crepitus to palpation.  No obvious open lesions.  Patient is nontoxic-appearing in no acute distress.  Obtained wound culture.  Fingerstick glucose is 86.  Prescribed clotrimazole, mupirocin, doxycycline.  Placed wound care referral.  Instructed to call PCP to schedule close follow-up ointment.  Educated to have low threshold to go to the emergency department if worsening symptoms or develops any systemic symptoms.  Patient expresses understanding and agrees with plan.  I did discuss patient with supervising physician Dr. Bartlett who also agrees with plan.    Orders and Diagnoses  There are no diagnoses linked to this encounter.    Medical Admin Record      Patient disposition: Home    Electronically signed by Paulo Zamudio PA-C  1:28 PM           [1] (Not in a hospital admission)  [2]   Past Medical History:  Diagnosis Date    Acute gastritis 01/31/2023    Bilateral carpal tunnel syndrome 01/31/2023    Carpal tunnel syndrome, bilateral upper limbs 12/07/2021    Bilateral carpal tunnel syndrome    Cholelithiasis 01/31/2023    Closed nondisplaced fracture of left patella with routine healing 01/31/2023    Diverticulosis of large intestine without perforation or abscess without bleeding     Diverticulosis of colon without diverticulitis    Elevated coronary artery calcium score 01/31/2023    Esophageal varices 01/31/2023    Gastro-esophageal reflux disease without esophagitis 06/20/2022    GERD (gastroesophageal reflux disease)    GI bleed      Mild intermittent asthma with (acute) exacerbation ([facility]) 01/31/2018    Mild intermittent asthma with acute exacerbation    Muscle wasting and atrophy, not elsewhere classified, unspecified site     Muscle wasting   [3]   Past Surgical History:  Procedure Laterality Date    NO PAST SURGERIES

## 2025-06-02 NOTE — PATIENT INSTRUCTIONS
Take medications as prescribed.  Maintain adequate hydration and nutrition.  Keep rash clean and dry.  If you develop worsening symptoms, systemic symptoms such as fevers or chills, vomiting sweats, or develop pain or having any purulent discharge or any other concerning symptoms please go to the emergency department immediately.  Follow-up with PCP in 1 to 2 days to schedule recheck appointment.  Call referral number today to schedule next available wound care appointment.

## 2025-06-08 LAB
BACTERIA SPEC AEROBE CULT: ABNORMAL
BACTERIA SPEC ANAEROBE CULT: ABNORMAL

## 2025-06-08 RX ORDER — AMOXICILLIN AND CLAVULANATE POTASSIUM 875; 125 MG/1; MG/1
875 TABLET, FILM COATED ORAL 2 TIMES DAILY
Qty: 20 TABLET | Refills: 0 | Status: SHIPPED | OUTPATIENT
Start: 2025-06-08 | End: 2025-06-18

## 2025-06-19 ENCOUNTER — APPOINTMENT (OUTPATIENT)
Facility: CLINIC | Age: 55
End: 2025-06-19
Payer: MEDICARE

## 2025-08-05 ENCOUNTER — HOSPITAL ENCOUNTER (OUTPATIENT)
Facility: HOSPITAL | Age: 55
Setting detail: OUTPATIENT SURGERY
End: 2025-08-05
Attending: INTERNAL MEDICINE | Admitting: INTERNAL MEDICINE
Payer: MEDICARE

## 2025-08-05 ENCOUNTER — TELEPHONE (OUTPATIENT)
Dept: PRIMARY CARE | Facility: CLINIC | Age: 55
End: 2025-08-05
Payer: MEDICARE

## 2025-08-05 DIAGNOSIS — I20.9 ANGINA PECTORIS: ICD-10-CM

## 2025-08-05 NOTE — TELEPHONE ENCOUNTER
Patient is going for heart cath on 8/13/2025, was told that amlodipine was in his med list and he should be taking it.  He does not have that medication, is not currently taking it.  Should he be taking it?  Called his pharmacy, they do not have it on file that they ever filled it.

## 2025-11-19 ENCOUNTER — APPOINTMENT (OUTPATIENT)
Dept: PRIMARY CARE | Facility: CLINIC | Age: 55
End: 2025-11-19
Payer: MEDICARE